# Patient Record
Sex: MALE | Race: WHITE | NOT HISPANIC OR LATINO
[De-identification: names, ages, dates, MRNs, and addresses within clinical notes are randomized per-mention and may not be internally consistent; named-entity substitution may affect disease eponyms.]

---

## 2017-11-15 ENCOUNTER — APPOINTMENT (OUTPATIENT)
Dept: PULMONOLOGY | Facility: CLINIC | Age: 32
End: 2017-11-15
Payer: COMMERCIAL

## 2017-11-15 VITALS
OXYGEN SATURATION: 98 % | SYSTOLIC BLOOD PRESSURE: 106 MMHG | HEART RATE: 81 BPM | HEIGHT: 66 IN | RESPIRATION RATE: 17 BRPM | DIASTOLIC BLOOD PRESSURE: 78 MMHG | BODY MASS INDEX: 26.52 KG/M2 | WEIGHT: 165 LBS

## 2017-11-15 DIAGNOSIS — Z91.89 OTHER SPECIFIED PERSONAL RISK FACTORS, NOT ELSEWHERE CLASSIFIED: ICD-10-CM

## 2017-11-15 DIAGNOSIS — Z84.89 FAMILY HISTORY OF OTHER SPECIFIED CONDITIONS: ICD-10-CM

## 2017-11-15 DIAGNOSIS — Z82.5 FAMILY HISTORY OF ASTHMA AND OTHER CHRONIC LOWER RESPIRATORY DISEASES: ICD-10-CM

## 2017-11-15 DIAGNOSIS — Z87.891 PERSONAL HISTORY OF NICOTINE DEPENDENCE: ICD-10-CM

## 2017-11-15 PROCEDURE — 94060 EVALUATION OF WHEEZING: CPT

## 2017-11-15 PROCEDURE — 71020: CPT

## 2017-11-15 PROCEDURE — 99205 OFFICE O/P NEW HI 60 MIN: CPT | Mod: 25

## 2017-11-15 PROCEDURE — 94729 DIFFUSING CAPACITY: CPT

## 2017-11-15 PROCEDURE — 94727 GAS DIL/WSHOT DETER LNG VOL: CPT

## 2017-11-15 RX ORDER — OLOPATADINE HYDROCHLORIDE 665 UG/1
0.6 SPRAY, METERED NASAL
Qty: 3 | Refills: 1 | Status: ACTIVE | COMMUNITY
Start: 2017-11-15 | End: 1900-01-01

## 2017-11-24 ENCOUNTER — LABORATORY RESULT (OUTPATIENT)
Age: 32
End: 2017-11-24

## 2017-11-24 ENCOUNTER — APPOINTMENT (OUTPATIENT)
Dept: PULMONOLOGY | Facility: CLINIC | Age: 32
End: 2017-11-24

## 2017-11-24 LAB
BASOPHILS # BLD AUTO: 0.03 K/UL
BASOPHILS NFR BLD AUTO: 0.5 %
EOSINOPHIL # BLD AUTO: 0.06 K/UL
EOSINOPHIL NFR BLD AUTO: 1 %
HCT VFR BLD CALC: 43.8 %
HGB BLD-MCNC: 14.8 G/DL
IMM GRANULOCYTES NFR BLD AUTO: 0.2 %
LYMPHOCYTES # BLD AUTO: 2.4 K/UL
LYMPHOCYTES NFR BLD AUTO: 38.1 %
MAN DIFF?: NORMAL
MCHC RBC-ENTMCNC: 31.4 PG
MCHC RBC-ENTMCNC: 33.8 GM/DL
MCV RBC AUTO: 93 FL
MONOCYTES # BLD AUTO: 0.6 K/UL
MONOCYTES NFR BLD AUTO: 9.5 %
NEUTROPHILS # BLD AUTO: 3.2 K/UL
NEUTROPHILS NFR BLD AUTO: 50.7 %
PLATELET # BLD AUTO: 361 K/UL
RBC # BLD: 4.71 M/UL
RBC # FLD: 12.4 %
WBC # FLD AUTO: 6.3 K/UL

## 2017-11-25 LAB
24R-OH-CALCIDIOL SERPL-MCNC: 38.8 PG/ML
25(OH)D3 SERPL-MCNC: 28.3 NG/ML
IGE SER-MCNC: 462 IU/ML

## 2017-11-28 ENCOUNTER — CLINICAL ADVICE (OUTPATIENT)
Age: 32
End: 2017-11-28

## 2017-11-29 LAB
A ALTERNATA IGE QN: <0.1 KUA/L
A FUMIGATUS IGE QN: 0.1 KUA/L
C ALBICANS IGE QN: <0.1 KUA/L
C HERBARUM IGE QN: <0.1 KUA/L
CAT DANDER IGE QN: 67 KUA/L
CLAM IGE QN: <0.1 KUA/L
CODFISH IGE QN: <0.1 KUA/L
COMMON RAGWEED IGE QN: 0.17 KUA/L
CORN IGE QN: <0.1 KUA/L
COW MILK IGE QN: 0.18 KUA/L
D FARINAE IGE QN: 0.42 KUA/L
D PTERONYSS IGE QN: 0.31 KUA/L
DEPRECATED A ALTERNATA IGE RAST QL: 0
DEPRECATED A FUMIGATUS IGE RAST QL: NORMAL
DEPRECATED C ALBICANS IGE RAST QL: 0
DEPRECATED C HERBARUM IGE RAST QL: 0
DEPRECATED CAT DANDER IGE RAST QL: ABNORMAL
DEPRECATED CLAM IGE RAST QL: 0
DEPRECATED CODFISH IGE RAST QL: 0
DEPRECATED COMMON RAGWEED IGE RAST QL: NORMAL
DEPRECATED CORN IGE RAST QL: 0
DEPRECATED COW MILK IGE RAST QL: NORMAL
DEPRECATED D FARINAE IGE RAST QL: ABNORMAL
DEPRECATED D PTERONYSS IGE RAST QL: NORMAL
DEPRECATED DOG DANDER IGE RAST QL: ABNORMAL
DEPRECATED EGG WHITE IGE RAST QL: 0
DEPRECATED M RACEMOSUS IGE RAST QL: 0
DEPRECATED PEANUT IGE RAST QL: NORMAL
DEPRECATED ROACH IGE RAST QL: 0
DEPRECATED SCALLOP IGE RAST QL: 0.13 KUA/L
DEPRECATED SESAME SEED IGE RAST QL: 0
DEPRECATED SHRIMP IGE RAST QL: 0
DEPRECATED SOYBEAN IGE RAST QL: 0
DEPRECATED TIMOTHY IGE RAST QL: ABNORMAL
DEPRECATED WALNUT IGE RAST QL: 0
DEPRECATED WHEAT IGE RAST QL: NORMAL
DEPRECATED WHITE OAK IGE RAST QL: ABNORMAL
DOG DANDER IGE QN: 5.37 KUA/L
EGG WHITE IGE QN: <0.1 KUA/L
M RACEMOSUS IGE QN: <0.1 KUA/L
PEANUT IGE QN: 0.22 KUA/L
ROACH IGE QN: <0.1 KUA/L
SCALLOP IGE QN: <0.1 KUA/L
SCALLOP IGE QN: NORMAL
SESAME SEED IGE QN: <0.1 KUA/L
SOYBEAN IGE QN: <0.1 KUA/L
TIMOTHY IGE QN: 2.47 KUA/L
WALNUT IGE QN: <0.1 KUA/L
WHEAT IGE QN: 0.1 KUA/L
WHITE OAK IGE QN: 15.6 KUA/L

## 2017-12-27 ENCOUNTER — APPOINTMENT (OUTPATIENT)
Dept: PULMONOLOGY | Facility: CLINIC | Age: 32
End: 2017-12-27
Payer: COMMERCIAL

## 2017-12-27 VITALS
HEIGHT: 68 IN | RESPIRATION RATE: 17 BRPM | OXYGEN SATURATION: 97 % | HEART RATE: 84 BPM | WEIGHT: 165 LBS | SYSTOLIC BLOOD PRESSURE: 118 MMHG | DIASTOLIC BLOOD PRESSURE: 80 MMHG | BODY MASS INDEX: 25.01 KG/M2

## 2017-12-27 PROCEDURE — 95012 NITRIC OXIDE EXP GAS DETER: CPT

## 2017-12-27 PROCEDURE — 94010 BREATHING CAPACITY TEST: CPT

## 2017-12-27 PROCEDURE — 99214 OFFICE O/P EST MOD 30 MIN: CPT | Mod: 25

## 2018-05-09 ENCOUNTER — NON-APPOINTMENT (OUTPATIENT)
Age: 33
End: 2018-05-09

## 2018-05-09 ENCOUNTER — APPOINTMENT (OUTPATIENT)
Dept: PULMONOLOGY | Facility: CLINIC | Age: 33
End: 2018-05-09
Payer: COMMERCIAL

## 2018-05-09 VITALS
BODY MASS INDEX: 24.25 KG/M2 | WEIGHT: 160 LBS | DIASTOLIC BLOOD PRESSURE: 80 MMHG | HEART RATE: 80 BPM | OXYGEN SATURATION: 99 % | RESPIRATION RATE: 17 BRPM | HEIGHT: 68 IN | SYSTOLIC BLOOD PRESSURE: 110 MMHG

## 2018-05-09 PROCEDURE — 94010 BREATHING CAPACITY TEST: CPT

## 2018-05-09 PROCEDURE — 95012 NITRIC OXIDE EXP GAS DETER: CPT

## 2018-05-09 PROCEDURE — 99214 OFFICE O/P EST MOD 30 MIN: CPT | Mod: 25

## 2018-05-09 RX ORDER — AMOXICILLIN AND CLAVULANATE POTASSIUM 875; 125 MG/1; MG/1
875-125 TABLET, COATED ORAL
Qty: 20 | Refills: 0 | Status: DISCONTINUED | COMMUNITY
Start: 2017-12-23

## 2018-06-04 ENCOUNTER — MEDICATION RENEWAL (OUTPATIENT)
Age: 33
End: 2018-06-04

## 2018-07-27 ENCOUNTER — MEDICATION RENEWAL (OUTPATIENT)
Age: 33
End: 2018-07-27

## 2018-08-28 RX ORDER — BECLOMETHASONE DIPROPIONATE 80 UG/1
80 AEROSOL, METERED RESPIRATORY (INHALATION) TWICE DAILY
Qty: 3 | Refills: 1 | Status: DISCONTINUED | COMMUNITY
Start: 2017-11-15 | End: 2018-08-28

## 2018-09-27 ENCOUNTER — MEDICATION RENEWAL (OUTPATIENT)
Age: 33
End: 2018-09-27

## 2018-09-27 ENCOUNTER — RX RENEWAL (OUTPATIENT)
Age: 33
End: 2018-09-27

## 2018-09-27 RX ORDER — ALBUTEROL SULFATE 90 UG/1
108 (90 BASE) AEROSOL, METERED RESPIRATORY (INHALATION)
Qty: 1 | Refills: 0 | Status: ACTIVE | COMMUNITY
Start: 2018-09-27 | End: 1900-01-01

## 2018-11-07 ENCOUNTER — NON-APPOINTMENT (OUTPATIENT)
Age: 33
End: 2018-11-07

## 2018-11-07 ENCOUNTER — APPOINTMENT (OUTPATIENT)
Dept: PULMONOLOGY | Facility: CLINIC | Age: 33
End: 2018-11-07
Payer: COMMERCIAL

## 2018-11-07 VITALS
HEIGHT: 68 IN | DIASTOLIC BLOOD PRESSURE: 80 MMHG | SYSTOLIC BLOOD PRESSURE: 110 MMHG | BODY MASS INDEX: 24.25 KG/M2 | HEART RATE: 87 BPM | WEIGHT: 160 LBS | OXYGEN SATURATION: 98 % | RESPIRATION RATE: 17 BRPM

## 2018-11-07 PROCEDURE — 95012 NITRIC OXIDE EXP GAS DETER: CPT

## 2018-11-07 PROCEDURE — 99214 OFFICE O/P EST MOD 30 MIN: CPT | Mod: 25

## 2018-11-07 PROCEDURE — 94010 BREATHING CAPACITY TEST: CPT

## 2018-11-07 NOTE — PROCEDURE
[FreeTextEntry1] : PFT-spi reveals normal flows with FEV1 of 3.46  ,which is   84% of predicted, normal flow volume loop \par \par FENO was    27   ; a normal value being less than 25\par \par Fractional exhaled nitric oxide (FENO) is regarded as a simple, noninvasive method for assessing eosinophilic airway inflammation. Produced by a variety of cells within the lung, nitric oxide (NO) concentrations are generally low in healthy individuals. However, high concentrations of NO appear to be involved in nonspecific host defense mechanisms and chronic inflammatory diseases such as asthma. The American Thoracic Society (ATS) therefore has recommended using FENO to aid in the diagnosis and monitoring of eosinophilic airway inflammation and asthma, and for identifying steroid responsive individuals whose chronic respiratory symptoms may be caused by airway inflammation.

## 2018-11-07 NOTE — HISTORY OF PRESENT ILLNESS
[FreeTextEntry1] : Mr. Zelaya is a 33 year old male presenting to the office for a follow up visit for asthma, elevated IgE level, smoking history, low vitamin D, seasonal allergies, wheezing and shortness of breath. His chief complaint is acid reflux.\par -He notes he has been feeling good aside from his acid reflux\par -He notes he has followed up with a GI specialist for his reflux\par -He notes he has been exercising 4 days weekly\par -He states he uses his rescue inhaler before exercising\par -He states his sleep has been improving\par -He notes he has room darkening shades\par -He reports his bowels are regular\par -He notes his energy level is good, rating it 9 out of 10\par -He notes he drinks coffee daily, but no more than 1 cup daily\par -He denies headaches, nausea vomiting, fevers, chills, sweats, chest pain or pressure, diarrhea, constipation, dysphagia, snoring

## 2018-11-07 NOTE — REASON FOR VISIT
[Follow-Up] : a follow-up visit [FreeTextEntry1] : asthma, elevated IgE level, smoking history, low vitamin D, seasonal allergies, wheezing and shortness of breath

## 2018-11-07 NOTE — ASSESSMENT
[FreeTextEntry1] : Mr. Zelaya is a 32 y/o M with hx of anxiety, seasonal allergies, GERD and frequent bronchitis presenting for pulmonary evaluation.\par \par His SOB is multifactorial due to:\par -asthma\par -poor mechanics of breathing\par -anxiety\par \par Problem 1: asthma\par -Bevespi 2 puffs BID\par -Qvar 80 2 puffs BID \par -Ventolin PRN and before exercise \par -hydration 16 ounces of water prior to exercise \par -Literature provided. \par \par -Inhaler technique reviewed as well as oral hygiene techniques reviewed with patient. Avoidance of cold air, extremes of temperature, rescue inhaler should be used before exercise. Order of medication reviewed with patient. Recommended use of a cool mist humidifier in the bedroom. Gargle and spit after use.\par -Asthma is believed to be caused by inherited (genetic) and environmental factor, but its exact cause is unknown. Asthma may be triggered by allergens, lung infections, or irritants in the air. Asthma triggers are different for each person \par \par Problem 2: poor mechanics of breathing\par -Proper breathing techniques were reviewed with an emphasis of exhalation. Patient instructed to breath in for 1 second and out for four seconds. Patient was encouraged to not talk while walking. \par \par Problem 3: allergies/sinus\par -Olopatadine 0.6% 1 sniff BID\par -recommended to try gluten free diet, as blood work reveals slight gluten allergy\par -Environmental measures for allergies were encouraged including mattress and pillow cover, air purifier, and environmental controls.\par \par Problem 4: former smoker\par -discussed staying nicotine-free \par -not a candidate for lung cancer screening \par \par problem 5: elevated IgE level\par -recommended as candidate of Xolair, though no need to implement at this point in time \par -Xolair is a recombinant DNA- derived humanized IgG1K monoclonal antibody that selectively binds ot human immunoglobulin E (IgE). Xolair is produced by a Chinese hamster ovary cell suspension culture in nutrient medium containing the antibiotic gentamicin. Gentamicin is not detectable in the final product. Xolair is a sterile, white, preservative free, lyophilized powder contained in a single use vial that is reconstituted with sterile water for suspension. Side effects include: wheezing, tightness of the chest, trouble breathing, hives, skin rash, feeling anxious or light-headed, fainting, warmth or tingling under skin, or swelling of face, lips, or tongue \par \par problem 6: low vitamin D\par -continue taking vitamin D supplements \par -Low vitamin D Has been associated with asthma exacerbations and increased allergic symptoms. The goal based on recent information is maintaining levels between 50-70 and low normal is 30. Recommended 50,000 units every two weeks to once a month depending on the level. \par \par problem 7: GERD\par -continue Zantac 300 mg QHS \par -Things to avoid including overeating, spicy foods, tight clothing, eating within two hours of bed, this list is not all inclusive. \par -For treatment of reflux, possible options discussed including diet control, H2 blockers, PPIs, as well as coating motility agents discussed as treatment options. Timing of meals and proximity of last meal to sleep were discussed. If symptoms persist, a formal gastrointestinal evaluation is needed.\par -Rule of 2's: Avoid eating too late, too fast, too much, too spicy or within two hours of bedtime \par \par problem 8: health maintenance \par -recommended to hydrate heavily with water in the morning and refrain from drinking coffee for 90 minutes after waking \par -s/p 2018 influenza vaccination\par -recommended strep pneumonia vaccines: Prevnar-13 vaccine, followed by Pneumo vaccine 23 one year following\par -recommended early intervention for URIs\par -recommended regular osteoporosis evaluations\par -recommended early dermatological evaluations\par -recommended after the age of 50 to the age of 70, colonoscopy every 5 years \par  \par \par Follow up in 6 months\par He is encouraged to call with any questions, concerns, or changes.

## 2018-11-07 NOTE — ADDENDUM
[FreeTextEntry1] : Documented by Danika Milligan acting as a scribe for Dr. Matthew Domínguez on 11/7/2018.\par \par All medical record entries made by the Scribe were at my, Dr. Matthew Domínguez's, direction and personally dictated by me on 11/7/2018. I have reviewed the chart and agree that the record accurately reflects my personal performance of the history, physical exam, assessment and plan. I have also personally directed, reviewed, and agree with the discharge instructions.

## 2019-01-07 ENCOUNTER — RX RENEWAL (OUTPATIENT)
Age: 34
End: 2019-01-07

## 2019-04-10 ENCOUNTER — RX RENEWAL (OUTPATIENT)
Age: 34
End: 2019-04-10

## 2019-05-06 ENCOUNTER — TRANSCRIPTION ENCOUNTER (OUTPATIENT)
Age: 34
End: 2019-05-06

## 2019-06-09 ENCOUNTER — RX RENEWAL (OUTPATIENT)
Age: 34
End: 2019-06-09

## 2019-09-09 ENCOUNTER — APPOINTMENT (OUTPATIENT)
Dept: PULMONOLOGY | Facility: CLINIC | Age: 34
End: 2019-09-09
Payer: COMMERCIAL

## 2019-09-09 ENCOUNTER — NON-APPOINTMENT (OUTPATIENT)
Age: 34
End: 2019-09-09

## 2019-09-09 VITALS
BODY MASS INDEX: 24.25 KG/M2 | WEIGHT: 160 LBS | RESPIRATION RATE: 17 BRPM | DIASTOLIC BLOOD PRESSURE: 70 MMHG | HEIGHT: 68 IN | SYSTOLIC BLOOD PRESSURE: 110 MMHG | OXYGEN SATURATION: 98 % | HEART RATE: 78 BPM

## 2019-09-09 DIAGNOSIS — R06.2 WHEEZING: ICD-10-CM

## 2019-09-09 DIAGNOSIS — Z87.891 PERSONAL HISTORY OF NICOTINE DEPENDENCE: ICD-10-CM

## 2019-09-09 PROCEDURE — 95012 NITRIC OXIDE EXP GAS DETER: CPT

## 2019-09-09 PROCEDURE — 94010 BREATHING CAPACITY TEST: CPT

## 2019-09-09 PROCEDURE — 99214 OFFICE O/P EST MOD 30 MIN: CPT | Mod: 25

## 2019-09-09 NOTE — PROCEDURE
[FreeTextEntry1] : PFT revealed normal flows, with a FEV1 of 3.55L, which is 87% of predicted, with a normal flow volume loop \par \par FENO was 22; a normal value being less than 25\par Fractional exhaled nitric oxide (FENO) is regarded as a simple, noninvasive method for assessing eosinophilic airway inflammation. Produced by a variety of cells within the lung, nitric oxide (NO) concentrations are generally low in healthy individuals. However, high concentrations of NO appear to be involved in nonspecific host defense mechanisms and chronic inflammatory diseases such as asthma. The American Thoracic Society (ATS) therefore has recommended using FENO to aid in the diagnosis and monitoring of eosinophilic airway inflammation and asthma, and for identifying steroid responsive individuals whose chronic respiratory symptoms may be caused by airway inflammation.

## 2019-09-09 NOTE — REVIEW OF SYSTEMS
[Negative] : Sleep Disorder [As Noted in HPI] : as noted in HPI [Wheezing] : no wheezing [Chest Discomfort] : no chest discomfort [Heartburn] : no heartburn [Reflux] : no reflux [Dysphagia] : no dysphagia [Constipation] : no constipation [Diarrhea] : no diarrhea

## 2019-09-09 NOTE — HISTORY OF PRESENT ILLNESS
[FreeTextEntry1] : Mr. Zelaya is a 34 year old male presenting to the office for a follow up visit for asthma, elevated IgE level, smoking history, low vitamin D, seasonal allergies, wheezing and shortness of breath. His chief complaint is occasional allergy symptoms.\par -he reports feeling generally well\par -he notes having had heartburn, but has since resolved with use of 2 medications.\par -he notes he has been exercising significantly regularly, doing high intensity training twice per week, full body workouts, and the [employer]\par -he notes his energy level is 8/10\par -he denies taking any new medications, vitamins, or supplements, except for a multivitamin and fish oil\par -he notes he has been taking Zyrtec for several months 1 QD, which alleviated his allergy symptoms\par -he denies any wheezing, headaches, nausea, chest pain, chest pressure, diarrhea, constipation, dysphagia, dizziness, sour taste in the mouth, heartburn, reflux

## 2019-09-09 NOTE — ASSESSMENT
[FreeTextEntry1] : Mr. Zelaya is a 35 y/o M with hx of anxiety, seasonal allergies, asthma, GERD and frequent bronchitis presenting for pulmonary evaluation. He is stable from a pulmonary perspective.\par \par His SOB is multifactorial due to:\par -asthma\par -poor mechanics of breathing\par -anxiety\par \par Problem 1: asthma (stable)\par -Bevespi 2 puffs BID\par -Qvar 80 2 puffs BID \par -Ventolin PRN and before exercise \par -hydration 16 ounces of water prior to exercise \par -Literature provided prior. \par \par -Inhaler technique reviewed as well as oral hygiene techniques reviewed with patient. Avoidance of cold air, extremes of temperature, rescue inhaler should be used before exercise. Order of medication reviewed with patient. Recommended use of a cool mist humidifier in the bedroom. Gargle and spit after use.\par -Asthma is believed to be caused by inherited (genetic) and environmental factor, but its exact cause is unknown. Asthma may be triggered by allergens, lung infections, or irritants in the air. Asthma triggers are different for each person \par \par Problem 2: poor mechanics of breathing\par -Proper breathing techniques were reviewed with an emphasis of exhalation. Patient instructed to breath in for 1 second and out for four seconds. Patient was encouraged to not talk while walking. \par \par Problem 3: allergies/sinus\par -Continue Zyrtec 10 mg QHS\par -Olopatadine 0.6% 1 sniff BID\par -recommended to try gluten free diet, as blood work reveals slight gluten allergy\par -Environmental measures for allergies were encouraged including mattress and pillow cover, air purifier, and environmental controls.\par \par Problem 4: former smoker\par -discussed staying nicotine-free today\par -not a candidate for lung cancer screening \par \par problem 5: elevated IgE level\par -recommended as candidate of Xolair, though no need to implement at this point in time \par -Xolair is a recombinant DNA- derived humanized IgG1K monoclonal antibody that selectively binds ot human immunoglobulin E (IgE). Xolair is produced by a Chinese hamster ovary cell suspension culture in nutrient medium containing the antibiotic gentamicin. Gentamicin is not detectable in the final product. Xolair is a sterile, white, preservative free, lyophilized powder contained in a single use vial that is reconstituted with sterile water for suspension. Side effects include: wheezing, tightness of the chest, trouble breathing, hives, skin rash, feeling anxious or light-headed, fainting, warmth or tingling under skin, or swelling of face, lips, or tongue \par \par problem 6: low vitamin D\par -continue taking vitamin D supplements \par -Low vitamin D Has been associated with asthma exacerbations and increased allergic symptoms. The goal based on recent information is maintaining levels between 50-70 and low normal is 30. Recommended 50,000 units every two weeks to once a month depending on the level. \par \par problem 7: GERD\par -continue Zantac 300 mg QHS \par -Things to avoid including overeating, spicy foods, tight clothing, eating within two hours of bed, this list is not all inclusive. \par -For treatment of reflux, possible options discussed including diet control, H2 blockers, PPIs, as well as coating motility agents discussed as treatment options. Timing of meals and proximity of last meal to sleep were discussed. If symptoms persist, a formal gastrointestinal evaluation is needed.\par -Rule of 2's: Avoid eating too late, too fast, too much, too spicy or within two hours of bedtime \par \par problem 8: health maintenance \par -recommended to hydrate heavily with water in the morning and refrain from drinking coffee for 90 minutes after waking \par -s/p 2018 influenza vaccination\par -recommended strep pneumonia vaccines: Prevnar-13 vaccine, followed by Pneumo vaccine 23 one year following\par -recommended early intervention for URIs\par -recommended regular osteoporosis evaluations\par -recommended early dermatological evaluations\par -recommended after the age of 50 to the age of 70, colonoscopy every 5 years \par \par Follow up in 6 months with SPI and NiOx\par He is encouraged to call with any questions, concerns, or changes.

## 2019-09-09 NOTE — ADDENDUM
[FreeTextEntry1] : Documented by Chuck Miranda acting as a scribe for Dr. Matthew Domínguez on 09/09/2019.\par \par All medical record entries made by the Scribe were at my, Dr. Matthew Domínguez's, direction and personally dictated by me on 09/09/2019. I have reviewed the chart and agree that the record accurately reflects my personal performance of the history, physical exam, assessment and plan. I have also personally directed, reviewed, and agree with the discharge instructions.

## 2019-10-10 ENCOUNTER — RX RENEWAL (OUTPATIENT)
Age: 34
End: 2019-10-10

## 2019-12-11 ENCOUNTER — RX RENEWAL (OUTPATIENT)
Age: 34
End: 2019-12-11

## 2019-12-24 ENCOUNTER — RX RENEWAL (OUTPATIENT)
Age: 34
End: 2019-12-24

## 2020-01-21 ENCOUNTER — RX RENEWAL (OUTPATIENT)
Age: 35
End: 2020-01-21

## 2020-01-24 ENCOUNTER — APPOINTMENT (OUTPATIENT)
Dept: ORTHOPEDIC SURGERY | Facility: CLINIC | Age: 35
End: 2020-01-24
Payer: COMMERCIAL

## 2020-01-24 VITALS — BODY MASS INDEX: 24.25 KG/M2 | WEIGHT: 160 LBS | HEIGHT: 68 IN

## 2020-01-24 DIAGNOSIS — M25.512 PAIN IN LEFT SHOULDER: ICD-10-CM

## 2020-01-24 DIAGNOSIS — S46.002A UNSPECIFIED INJURY OF MUSCLE(S) AND TENDON(S) OF THE ROTATOR CUFF OF LEFT SHOULDER, INITIAL ENCOUNTER: ICD-10-CM

## 2020-01-24 DIAGNOSIS — Z87.09 PERSONAL HISTORY OF OTHER DISEASES OF THE RESPIRATORY SYSTEM: ICD-10-CM

## 2020-01-24 PROCEDURE — 73030 X-RAY EXAM OF SHOULDER: CPT | Mod: LT

## 2020-01-24 PROCEDURE — 99203 OFFICE O/P NEW LOW 30 MIN: CPT

## 2020-01-24 RX ORDER — CELECOXIB 200 MG/1
200 CAPSULE ORAL TWICE DAILY
Qty: 60 | Refills: 3 | Status: ACTIVE | COMMUNITY
Start: 2020-01-24 | End: 1900-01-01

## 2020-01-24 NOTE — HISTORY OF PRESENT ILLNESS
[de-identified] : LEFT SHOULDER\par DECEMBER 15, 2019- GYM INJURY\par PAIN LEVEL 5/10\par INTERMITTENT PAIN\par DULL, SHARP WHEN AGGRAVATED \par BETTER WITH HEAT, ICE , NAPROXEN\par WORSE LYING DOWN, REACHING ACROSS, BEHIND, \par CLICKING\par \par

## 2020-01-24 NOTE — PHYSICAL EXAM
[de-identified] : PHYSICAL EXAM LEFT  SHOULDER\par \par NORMAL POSTURE / SCAPULAR PROTRACTION\par AROM 140 / 140 / 90 / 30\par TENDER: SA REGION / AC JOINT / GH JOINT / BICEPS GROOVE\par \par SPECIAL TESTING :\par PAGAN - POSITIVE \par NIYA - POSITIVE \par SPEED TEST - POSITIVE\par \par BARON - NEGATIVE \par APPREHENSION AND SUPPRESSION - NEGATIVE \par \par RC STRENGTH TESTING \par SS:  5/5\par SUB 5/5\par IS     5/5\par BICEPS  5/5\par \par SENSATION  - GROSSLY INTACT\par \par \par  [de-identified] : LEFT SHOULDER XRAY -  \par NO OBVIOUS FRACTURE , NO SIGNIFICANT OSTEOARTHRITIS, SEPARATION OR DISLOCATION \par TYPE 2B ACROMION low lying\par CSA= 37.1\par

## 2020-01-24 NOTE — DISCUSSION/SUMMARY
[de-identified] : DICLOFENAC 2 X DAY 2-3 WEEKS\par HOME EXERCISES DAILY\par PT 2 X 4 WEEKS \par \par CONSIDER KENALOG INJECTION \par \par MRI IF NO RELIEF AFTER 12 WEEK OF TREATMENT\par

## 2020-02-16 ENCOUNTER — RX RENEWAL (OUTPATIENT)
Age: 35
End: 2020-02-16

## 2020-04-13 ENCOUNTER — APPOINTMENT (OUTPATIENT)
Dept: PULMONOLOGY | Facility: CLINIC | Age: 35
End: 2020-04-13
Payer: COMMERCIAL

## 2020-04-13 ENCOUNTER — APPOINTMENT (OUTPATIENT)
Dept: PULMONOLOGY | Facility: CLINIC | Age: 35
End: 2020-04-13

## 2020-04-13 PROCEDURE — 99214 OFFICE O/P EST MOD 30 MIN: CPT | Mod: 95

## 2020-04-13 RX ORDER — DESLORATADINE 5 MG/1
5 TABLET ORAL
Qty: 90 | Refills: 1 | Status: ACTIVE | COMMUNITY
Start: 2020-04-13 | End: 1900-01-01

## 2020-04-13 NOTE — ASSESSMENT
[FreeTextEntry1] : Mr. Zelaya is a 35 y/o M with hx of anxiety, seasonal allergies, asthma, GERD and frequent bronchitis video calls for pulmonary evaluation. He has residual of sinusitis. \par \par His SOB is multifactorial due to:\par -asthma\par -poor mechanics of breathing\par -anxiety\par \par Problem 1: asthma (stable)\par -Bevespi 2 puffs BID\par -Qvar 80 2 puffs BID \par -Ventolin PRN and before exercise \par -hydration 16 ounces of water prior to exercise \par -Literature provided prior. \par \par -Inhaler technique reviewed as well as oral hygiene techniques reviewed with patient. Avoidance of cold air, extremes of temperature, rescue inhaler should be used before exercise. Order of medication reviewed with patient. Recommended use of a cool mist humidifier in the bedroom. Gargle and spit after use.\par -Asthma is believed to be caused by inherited (genetic) and environmental factor, but its exact cause is unknown. Asthma may be triggered by allergens, lung infections, or irritants in the air. Asthma triggers are different for each person \par \par Problem 2: poor mechanics of breathing\par -Proper breathing techniques were reviewed with an emphasis of exhalation. Patient instructed to breath in for 1 second and out for four seconds. Patient was encouraged to not talk while walking. \par \par Problem 3: allergies/sinus (active) \par -Continue Zyrtec 10 mg QHS\par -Add Clarinex 5mg QHS \par -Add Qnasal 1 sniff/nostril BID \par -Olopatadine 0.6% 1 sniff BID\par -recommended to try gluten free diet, as blood work reveals slight gluten allergy\par -Environmental measures for allergies were encouraged including mattress and pillow cover, air purifier, and environmental controls.\par \par Problem 4: former smoker\par -discussed staying nicotine-free today\par -not a candidate for lung cancer screening \par \par problem 5: elevated IgE level\par -recommended as candidate of Xolair, though no need to implement at this point in time \par -Xolair is a recombinant DNA- derived humanized IgG1K monoclonal antibody that selectively binds ot human immunoglobulin E (IgE). Xolair is produced by a Chinese hamster ovary cell suspension culture in nutrient medium containing the antibiotic gentamicin. Gentamicin is not detectable in the final product. Xolair is a sterile, white, preservative free, lyophilized powder contained in a single use vial that is reconstituted with sterile water for suspension. Side effects include: wheezing, tightness of the chest, trouble breathing, hives, skin rash, feeling anxious or light-headed, fainting, warmth or tingling under skin, or swelling of face, lips, or tongue \par \par problem 6: low vitamin D\par -continue taking vitamin D supplements \par -Low vitamin D Has been associated with asthma exacerbations and increased allergic symptoms. The goal based on recent information is maintaining levels between 50-70 and low normal is 30. Recommended 50,000 units every two weeks to once a month depending on the level. \par \par problem 7: GERD\par -continue Zantac 300 mg QHS \par -Things to avoid including overeating, spicy foods, tight clothing, eating within two hours of bed, this list is not all inclusive. \par -For treatment of reflux, possible options discussed including diet control, H2 blockers, PPIs, as well as coating motility agents discussed as treatment options. Timing of meals and proximity of last meal to sleep were discussed. If symptoms persist, a formal gastrointestinal evaluation is needed.\par -Rule of 2's: Avoid eating too late, too fast, too much, too spicy or within two hours of bedtime \par \par Problem 8: Health Maintenance/COVID19 Precautions:\par - Clean your hands often. Wash your hands often with soap and water for at least 20 seconds, especially after blowing your nose, coughing, or sneezing, or having been in a public place.\par - If soap and water are not available, use a hand  that contains at least 60% alcohol.\par - To the extent possible, avoid touching high-touch surfaces in public places - elevator buttons, door handles, handrails, handshaking with people, etc. Use a tissue or your sleeve to cover your hand or finger if you must touch something.\par - Wash your hands after touching surfaces in public places.\par - Avoid touching your face, nose, eyes, etc.\par - Clean and disinfect your home to remove germs: practice routine cleaning of frequently touched surfaces (for example: tables, doorknobs, light switches, handles, desks, toilets, faucets, sinks & cell phones)\par - Avoid crowds, especially in poorly ventilated spaces. Your risk of exposure to respiratory viruses like COVID-19 may increase in crowded, closed-in settings with little air circulation if\par there are people in the crowd who are sick. All patients are recommended to practice social distancing and stay at least 6 feet away from others.\par - Avoid all non-essential travel including plane trips, and especially avoid embarking on cruise ships.\par -If COVID-19 is spreading in your community, take extra measures to put distance between yourself and other people to further reduce your risk of being exposed to this new virus.\par -Stay home as much as possible.\par - Consider ways of getting food brought to your house through family, social, or commercial networks\par -Be aware that the virus has been known to live in the air up to 3 hours post exposure, cardboard up to 24 hours post exposure, copper up to 4 hours post exposure, steel and plastic up to 2-3 days post exposure. Risk of transmission from these surfaces are affected by many variables.\par \par Immune Support Recommendations:\par -OTC Vitamin C 500mg BID \par -OTC Quercetin 250-500mg BID \par -OTC Zinc 75-100mg per day \par -OTC Melatonin 1or 2mg a night \par -OTC Vitamin D 1-4000mg per day\par -Tonic Water 8oz\par -Recommended to Stay Hydrated (At least a gallon/day)\par \par Asthma and COVID19:\par You need to make sure your asthma is under control. This often requires the use of inhaled corticosteroids (and sometimes oral corticosteroids). Inhaled corticosteroids do not likely reduce your immune system’s ability to fight infections, but oral corticosteroids may. It is important to use the steps above to protect yourself to limit your exposure to any respiratory virus. \par \par problem 9: health maintenance \par -recommended to hydrate heavily with water in the morning and refrain from drinking coffee for 90 minutes after waking \par -s/p 2018 influenza vaccination\par -recommended strep pneumonia vaccines: Prevnar-13 vaccine, followed by Pneumo vaccine 23 one year following\par -recommended early intervention for URIs\par -recommended regular osteoporosis evaluations\par -recommended early dermatological evaluations\par -recommended after the age of 50 to the age of 70, colonoscopy every 5 years \par \par Follow up in 6 months with SPI and NiOx\par He is encouraged to call with any questions, concerns, or changes.

## 2020-04-13 NOTE — REVIEW OF SYSTEMS
[Fatigue] : fatigue [Sore Throat] : sore throat [Eye Irritation] : eye irritation [Nasal Congestion] : nasal congestion [Sinus Problems] : sinus problems [Negative] : Endocrine

## 2020-04-13 NOTE — HISTORY OF PRESENT ILLNESS
[Home] : at home, [unfilled] , at the time of the visit. [Medical Office: ([facility])___] : at ~his/her~ medical office located in [place] [Patient] : the patient [Self] : self [FreeTextEntry2] : SINDHU BUSTAMANTE  [FreeTextEntry1] : Mr. Zelaya is a 34 year old male video calls to the office for a follow up visit for asthma, elevated IgE level, smoking history, low vitamin D, seasonal allergies, wheezing and shortness of breath. His chief complaint is current allergies Sx. \par -generally has been feeling well. \par -just suffering from the seasonal allergies. \par -sense of taste has been muted due to allergies. \par -currently taking Zyrtec for the allergies.\par -he notes that His bowels are regular. \par -sore throat presented before the start of the allergies. \par -energy level is wiped out when he takes Zyrtec\par -still using the [place]. \par -has sinus pressure.\par \par -He denies any chest pain, chest pressure, diarrhea, constipation, dysphagia, dizziness, sour taste in the mouth, leg swelling, leg pain, heartburn, reflux, myalgias or arthralgias.

## 2020-04-13 NOTE — ADDENDUM
[FreeTextEntry1] : Documented by Juan Antonio Castillo acting as a scribe for Dr. Matthew Domínguez on 04/13/2020 \par \par All medical record entries made by the Scribe were at my, Dr. Matthew Domínguez's, direction and personally dictated by me on 04/13/2020 . I have reviewed the chart and agree that the record accurately reflects my personal performance of the history, physical exam, assessment and plan. I have also personally directed, reviewed, and agree with the discharge instructions.

## 2020-04-13 NOTE — REASON FOR VISIT
[Follow-Up] : a follow-up visit [FreeTextEntry1] : Video Telehealth - asthma, elevated IgE level, smoking history, low vitamin D, seasonal allergies, wheezing and shortness of breath

## 2020-04-13 NOTE — PHYSICAL EXAM
[No Acute Distress] : no acute distress [Well Nourished] : well nourished [Normal Appearance] : normal appearance [Well Groomed] : well groomed [No Deformities] : no deformities [Well Developed] : well developed [TextBox_2] : Appears Well.

## 2020-04-14 RX ORDER — BECLOMETHASONE DIPROPIONATE 80 UG/1
80 AEROSOL, METERED NASAL
Qty: 3 | Refills: 1 | Status: DISCONTINUED | COMMUNITY
Start: 2020-04-13 | End: 2020-04-14

## 2020-05-14 ENCOUNTER — RX RENEWAL (OUTPATIENT)
Age: 35
End: 2020-05-14

## 2020-06-05 ENCOUNTER — RX RENEWAL (OUTPATIENT)
Age: 35
End: 2020-06-05

## 2020-08-09 ENCOUNTER — RX RENEWAL (OUTPATIENT)
Age: 35
End: 2020-08-09

## 2020-09-18 ENCOUNTER — APPOINTMENT (OUTPATIENT)
Dept: DERMATOLOGY | Facility: CLINIC | Age: 35
End: 2020-09-18
Payer: COMMERCIAL

## 2020-09-18 DIAGNOSIS — L81.4 OTHER MELANIN HYPERPIGMENTATION: ICD-10-CM

## 2020-09-18 DIAGNOSIS — Z12.83 ENCOUNTER FOR SCREENING FOR MALIGNANT NEOPLASM OF SKIN: ICD-10-CM

## 2020-09-18 DIAGNOSIS — D48.9 NEOPLASM OF UNCERTAIN BEHAVIOR, UNSPECIFIED: ICD-10-CM

## 2020-09-18 PROCEDURE — 99203 OFFICE O/P NEW LOW 30 MIN: CPT | Mod: 25

## 2020-09-18 PROCEDURE — 11102 TANGNTL BX SKIN SINGLE LES: CPT

## 2020-09-19 ENCOUNTER — RX RENEWAL (OUTPATIENT)
Age: 35
End: 2020-09-19

## 2020-09-19 RX ORDER — BECLOMETHASONE DIPROPIONATE HFA 80 UG/1
80 AEROSOL, METERED RESPIRATORY (INHALATION) TWICE DAILY
Qty: 10.6 | Refills: 3 | Status: ACTIVE | COMMUNITY
Start: 2018-08-28 | End: 1900-01-01

## 2020-09-21 ENCOUNTER — APPOINTMENT (OUTPATIENT)
Dept: PULMONOLOGY | Facility: CLINIC | Age: 35
End: 2020-09-21
Payer: COMMERCIAL

## 2020-09-21 VITALS
WEIGHT: 165 LBS | HEART RATE: 77 BPM | OXYGEN SATURATION: 98 % | BODY MASS INDEX: 25.01 KG/M2 | RESPIRATION RATE: 17 BRPM | DIASTOLIC BLOOD PRESSURE: 70 MMHG | HEIGHT: 68 IN | TEMPERATURE: 97.7 F | SYSTOLIC BLOOD PRESSURE: 120 MMHG

## 2020-09-21 PROCEDURE — 99214 OFFICE O/P EST MOD 30 MIN: CPT | Mod: 25

## 2020-09-21 PROCEDURE — 90682 RIV4 VACC RECOMBINANT DNA IM: CPT

## 2020-09-21 PROCEDURE — 95012 NITRIC OXIDE EXP GAS DETER: CPT

## 2020-09-21 PROCEDURE — G0008: CPT

## 2020-09-21 NOTE — HISTORY OF PRESENT ILLNESS
[FreeTextEntry1] : Mr. Zelaya is a 35 year old male presents to the office for a follow up visit for asthma, elevated IgE level, smoking history, low vitamin D, seasonal allergies, wheezing and shortness of breath. His chief complaint is \par \par -he notes generally feeling well\par -he notes recent broken nose and ER visit, CXR, nasal passages clear, Dr. Agudelo, recommended option of plastic surgery to reset\par -he notes exercising regularly cycling, lifting weights\par -he notes regular bowel movements \par -he notes intermittent dysphagia exacerbated by allergy season\par -he notes sleep moderately improved\par -he notes weight decreased\par \par \par -denies any headaches, nausea, vomiting, fever, chills, sweats, chest pain, chest pressure, diarrhea, constipation, dizziness, leg swelling, leg pain, myalgias, arthralgias, itchy eyes, itchy ears, heartburn, reflux, or sour taste in the mouth.\par \par

## 2020-09-21 NOTE — PHYSICAL EXAM
[No Acute Distress] : no acute distress [Normal Oropharynx] : normal oropharynx [Normal Appearance] : normal appearance [No Neck Mass] : no neck mass [Normal Rate/Rhythm] : normal rate/rhythm [Normal S1, S2] : normal s1, s2 [No Murmurs] : no murmurs [No Resp Distress] : no resp distress [Clear to Auscultation Bilaterally] : clear to auscultation bilaterally [No Abnormalities] : no abnormalities [Benign] : benign [Normal Gait] : normal gait [No Clubbing] : no clubbing [No Cyanosis] : no cyanosis [No Edema] : no edema [FROM] : FROM [Normal Color/ Pigmentation] : normal color/ pigmentation [No Focal Deficits] : no focal deficits [Oriented x3] : oriented x3 [Normal Affect] : normal affect [II] : Mallampati Class: II [TextBox_68] : I:E ratio 1:3; clear

## 2020-09-21 NOTE — PROCEDURE
[FreeTextEntry1] : FENO was 35; a normal value being less than 25\par Fractional exhaled nitric oxide (FENO) is regarded as a simple, noninvasive method for assessing eosinophilic airway inflammation. Produced by a variety of cells within the lung, nitric oxide (NO) concentrations are generally low in healthy individuals. However, high concentrations of NO appear to be involved in nonspecific host defense mechanisms and chronic inflammatory diseases such as asthma. The American Thoracic Society (ATS) therefore has recommended using FENO to aid in the diagnosis and monitoring of eosinophilic airway inflammation and asthma, and for identifying steroid responsive individuals whose chronic respiratory symptoms may be caused by airway inflammation.

## 2020-09-21 NOTE — ASSESSMENT
[FreeTextEntry1] : Mr. Zelaya is a 36 y/o M with hx of anxiety, seasonal allergies, asthma, GERD and frequent bronchitis presents for pulmonary evaluation. He has residual of sinusitis. \par \par His SOB is multifactorial due to:\par -asthma\par -poor mechanics of breathing\par -anxiety\par \par Problem 1: asthma (stable)\par -continue Bevespi 2 puffs BID\par -continue Qvar 80 2 puffs BID \par -continue Ventolin PRN and before exercise \par -recommended hydration 16 ounces of water prior to exercise \par -Literature provided prior. \par \par -Inhaler technique reviewed as well as oral hygiene techniques reviewed with patient. Avoidance of cold air, extremes of temperature, rescue inhaler should be used before exercise. Order of medication reviewed with patient. Recommended use of a cool mist humidifier in the bedroom. Gargle and spit after use.\par -Asthma is believed to be caused by inherited (genetic) and environmental factor, but its exact cause is unknown. Asthma may be triggered by allergens, lung infections, or irritants in the air. Asthma triggers are different for each person \par \par Problem 2: poor mechanics of breathing\par -Proper breathing techniques were reviewed with an emphasis of exhalation. Patient instructed to breath in for 1 second and out for four seconds. Patient was encouraged to not talk while walking. \par \par Problem 3: allergies/sinus (s/p fx nose)\par -Continue Zyrtec 10 mg QHS\par -continue Clarinex 5mg QHS \par -continue Qnasal 1 sniff/nostril BID \par -continue Olopatadine 0.6% 1 sniff BID\par -recommended to try gluten free diet, as blood work reveals slight gluten allergy\par -Environmental measures for allergies were encouraged including mattress and pillow cover, air purifier, and environmental controls.\par \par Problem 4: former smoker \par -discussed staying nicotine-free (9/21/2020)\par -not a candidate for lung cancer screening \par \par problem 5: elevated IgE level\par -recommended as candidate of Xolair, though no need to implement at this point in time \par -Xolair is a recombinant DNA- derived humanized IgG1K monoclonal antibody that selectively binds ot human immunoglobulin E (IgE). Xolair is produced by a Chinese hamster ovary cell suspension culture in nutrient medium containing the antibiotic gentamicin. Gentamicin is not detectable in the final product. Xolair is a sterile, white, preservative free, lyophilized powder contained in a single use vial that is reconstituted with sterile water for suspension. Side effects include: wheezing, tightness of the chest, trouble breathing, hives, skin rash, feeling anxious or light-headed, fainting, warmth or tingling under skin, or swelling of face, lips, or tongue \par \par problem 6: low vitamin D\par -continue taking vitamin D supplements \par -Low vitamin D Has been associated with asthma exacerbations and increased allergic symptoms. The goal based on recent information is maintaining levels between 50-70 and low normal is 30. Recommended 50,000 units every two weeks to once a month depending on the level. \par \par problem 7: GERD\par -Add Pepcid 40 mg QHS \par -Things to avoid including overeating, spicy foods, tight clothing, eating within two hours of bed, this list is not all inclusive. \par -For treatment of reflux, possible options discussed including diet control, H2 blockers, PPIs, as well as coating motility agents discussed as treatment options. Timing of meals and proximity of last meal to sleep were discussed. If symptoms persist, a formal gastrointestinal evaluation is needed.\par -Rule of 2's: Avoid eating too late, too fast, too much, too spicy or within two hours of bedtime \par \par Problem 8: Health Maintenance/COVID19 Precautions:\par - Clean your hands often. Wash your hands often with soap and water for at least 20 seconds, especially after blowing your nose, coughing, or sneezing, or having been in a public place.\par - If soap and water are not available, use a hand  that contains at least 60% alcohol.\par - To the extent possible, avoid touching high-touch surfaces in public places - elevator buttons, door handles, handrails, handshaking with people, etc. Use a tissue or your sleeve to cover your hand or finger if you must touch something.\par - Wash your hands after touching surfaces in public places.\par - Avoid touching your face, nose, eyes, etc.\par - Clean and disinfect your home to remove germs: practice routine cleaning of frequently touched surfaces (for example: tables, doorknobs, light switches, handles, desks, toilets, faucets, sinks & cell phones)\par - Avoid crowds, especially in poorly ventilated spaces. Your risk of exposure to respiratory viruses like COVID-19 may increase in crowded, closed-in settings with little air circulation if\par there are people in the crowd who are sick. All patients are recommended to practice social distancing and stay at least 6 feet away from others.\par - Avoid all non-essential travel including plane trips, and especially avoid embarking on cruise ships.\par -If COVID-19 is spreading in your community, take extra measures to put distance between yourself and other people to further reduce your risk of being exposed to this new virus.\par -Stay home as much as possible.\par - Consider ways of getting food brought to your house through family, social, or commercial networks\par -Be aware that the virus has been known to live in the air up to 3 hours post exposure, cardboard up to 24 hours post exposure, copper up to 4 hours post exposure, steel and plastic up to 2-3 days post exposure. Risk of transmission from these surfaces are affected by many variables.\par \par Immune Support Recommendations:\par -OTC Vitamin C 500mg BID \par -OTC Quercetin 250-500mg BID \par -OTC Zinc 75-100mg per day \par -OTC Melatonin 1or 2mg a night \par -OTC Vitamin D 1-4000mg per day\par -Tonic Water 8oz\par -Recommended to Stay Hydrated (At least a gallon/day)\par \par Asthma and COVID19:\par You need to make sure your asthma is under control. This often requires the use of inhaled corticosteroids (and sometimes oral corticosteroids). Inhaled corticosteroids do not likely reduce your immune system’s ability to fight infections, but oral corticosteroids may. It is important to use the steps above to protect yourself to limit your exposure to any respiratory virus. \par \par problem 9: Health Maintenance/COVID19 Precautions \par - Clean your hands often. Wash your hands often with soap and water for at least 20 seconds, especially after blowing your nose, coughing, or sneezing, or having been in a public place.\par - If soap and water are not available, use a hand  that contains at least 60% alcohol.\par - To the extent possible, avoid touching high-touch surfaces in public places - elevator buttons, door handles, handrails, handshaking with people, etc. Use a tissue or your sleeve to cover your hand or finger if you must touch something.\par - Wash your hands after touching surfaces in public places.\par - Avoid touching your face, nose, eyes, etc.\par - Clean and disinfect your home to remove germs: practice routine cleaning of frequently touched surfaces (for example: tables, doorknobs, light switches, handles, desks, toilets, faucets, sinks & cell phones)\par - Avoid crowds, especially in poorly ventilated spaces. Your risk of exposure to respiratory viruses like COVID-19 may increase in crowded, closed-in settings with little air circulation if there are people in the crowd who are sick. All patients are recommended to practice social distancing and stay at least 6 feet away from others. \par - Avoid all non-essential travel including plane trips, and especially avoid embarking on cruise ships.\par -If COVID-19 is spreading in your community, take extra measures to put distance between yourself and other people to further reduce your risk of being exposed to this new virus.\par -Stay home as much as possible.\par - Consider ways of getting food brought to your house through family, social, or commercial networks\par -Be aware that the virus has been known to live in the air up to 3 hours post exposure, cardboard up to 24 hours post exposure, copper up to 4 hours post exposure, steel and plastic up to 2-3 days post exposure. Risk of transmission from these surfaces are affected by many variables.\par COVID-19 precautionary Immune Support Recommendations:\par -OTC Vitamin C 500mg BID \par -OTC Quercetin 250-500mg BID \par -OTC Zinc 75-100mg per day \par -OTC Melatonin 1 or 2mg a night \par -OTC Vitamin D 1-4000mg per day \par -OTC Tonic Water 8oz per day\par -Water 0.5-1 gallon per day\par Asthma and COVID19:\par You need to make sure your asthma is under control. This often requires the use of inhaled corticosteroids (and sometimes oral corticosteroids). Inhaled corticosteroids do not likely reduce your immune system’s ability to fight infections, but oral corticosteroids may. It is important to use the steps above to protect yourself to limit your exposure to any respiratory virus. \par \par problem 10: health maintenance \par -recommended to hydrate heavily with water in the morning and refrain from drinking coffee for 90 minutes after waking \par -s/p 2020 influenza vaccination\par -recommended strep pneumonia vaccines: Prevnar-13 vaccine, followed by Pneumo vaccine 23 one year following\par -recommended early intervention for URIs\par -recommended regular osteoporosis evaluations\par -recommended early dermatological evaluations\par -recommended after the age of 50 to the age of 70, colonoscopy every 5 years \par \par Follow up in 6 months with SPI and NiOx\par He is encouraged to call with any questions, concerns, or changes.

## 2020-09-21 NOTE — ADDENDUM
[FreeTextEntry1] : Documented by Brendan Carlson acting as a scribe for Dr. Matthew Domínguez on 09/21/2020.\par \par All medical record entries made by the Scribe were at my, Dr. Matthew Domínguez's, direction and personally dictated by me on 09/21/2020 . I have reviewed the chart and agree that the record accurately reflects my personal performance of the history, physical exam, assessment and plan. I have also personally directed, reviewed, and agree with the discharge instructions. \par

## 2020-09-22 LAB — CORE LAB BIOPSY: NORMAL

## 2020-10-14 ENCOUNTER — RX RENEWAL (OUTPATIENT)
Age: 35
End: 2020-10-14

## 2020-11-17 ENCOUNTER — TRANSCRIPTION ENCOUNTER (OUTPATIENT)
Age: 35
End: 2020-11-17

## 2020-12-19 ENCOUNTER — RX RENEWAL (OUTPATIENT)
Age: 35
End: 2020-12-19

## 2021-03-02 DIAGNOSIS — Z01.812 ENCOUNTER FOR PREPROCEDURAL LABORATORY EXAMINATION: ICD-10-CM

## 2021-06-12 ENCOUNTER — RX RENEWAL (OUTPATIENT)
Age: 36
End: 2021-06-12

## 2021-06-12 ENCOUNTER — TRANSCRIPTION ENCOUNTER (OUTPATIENT)
Age: 36
End: 2021-06-12

## 2021-06-15 ENCOUNTER — RX RENEWAL (OUTPATIENT)
Age: 36
End: 2021-06-15

## 2021-06-15 RX ORDER — ALBUTEROL SULFATE 90 UG/1
108 (90 BASE) AEROSOL, METERED RESPIRATORY (INHALATION) EVERY 6 HOURS
Qty: 3 | Refills: 2 | Status: ACTIVE | COMMUNITY
Start: 2020-04-13 | End: 1900-01-01

## 2021-08-19 ENCOUNTER — RX RENEWAL (OUTPATIENT)
Age: 36
End: 2021-08-19

## 2021-09-23 ENCOUNTER — RX RENEWAL (OUTPATIENT)
Age: 36
End: 2021-09-23

## 2021-10-19 ENCOUNTER — RX RENEWAL (OUTPATIENT)
Age: 36
End: 2021-10-19

## 2021-10-30 ENCOUNTER — TRANSCRIPTION ENCOUNTER (OUTPATIENT)
Age: 36
End: 2021-10-30

## 2021-11-01 ENCOUNTER — APPOINTMENT (OUTPATIENT)
Dept: PULMONOLOGY | Facility: CLINIC | Age: 36
End: 2021-11-01
Payer: COMMERCIAL

## 2021-11-01 VITALS
OXYGEN SATURATION: 98 % | RESPIRATION RATE: 16 BRPM | BODY MASS INDEX: 25.74 KG/M2 | SYSTOLIC BLOOD PRESSURE: 120 MMHG | WEIGHT: 164 LBS | DIASTOLIC BLOOD PRESSURE: 76 MMHG | HEIGHT: 67 IN | HEART RATE: 82 BPM | TEMPERATURE: 97.1 F

## 2021-11-01 PROCEDURE — 99214 OFFICE O/P EST MOD 30 MIN: CPT | Mod: 25

## 2021-11-01 PROCEDURE — 94726 PLETHYSMOGRAPHY LUNG VOLUMES: CPT

## 2021-11-01 PROCEDURE — 94010 BREATHING CAPACITY TEST: CPT

## 2021-11-01 PROCEDURE — 95012 NITRIC OXIDE EXP GAS DETER: CPT

## 2021-11-01 PROCEDURE — 94729 DIFFUSING CAPACITY: CPT

## 2021-11-01 PROCEDURE — 94618 PULMONARY STRESS TESTING: CPT

## 2021-11-01 RX ORDER — OLOPATADINE HYDROCHLORIDE 665 UG/1
0.6 SPRAY, METERED NASAL
Qty: 3 | Refills: 1 | Status: ACTIVE | COMMUNITY
Start: 2021-11-01 | End: 1900-01-01

## 2021-11-01 RX ORDER — MONTELUKAST 10 MG/1
10 TABLET, FILM COATED ORAL
Qty: 1 | Refills: 1 | Status: ACTIVE | COMMUNITY
Start: 2021-11-01 | End: 1900-01-01

## 2021-11-01 NOTE — ADDENDUM
[FreeTextEntry1] : Documented by Nanci Best acting as a scribe for Dr. Matthew Domínguez on (11/01/2021).\par \par All medical record entries made by the Scribe were at my, Dr. Matthew Domínguez's, direction and personally dictated by me on (11/01/2021). I have reviewed the chart and agree that the record accurately reflects my personal performance of the history, physical exam, assessment and plan. I have also personally directed, reviewed, and agree with the discharge instructions.\par

## 2021-11-01 NOTE — HISTORY OF PRESENT ILLNESS
[FreeTextEntry1] : Mr. Zelaya is a 36 year old male presents to the office for a follow up visit for asthma, elevated IgE level, smoking history, low vitamin D, seasonal allergies, wheezing and shortness of breath. His chief complaint is \par -he notes having some sinus infection a couple months ago \par -he notes feeling a pnd and infection that he cant figure out what from \par -he notes having half taste and smell\par -he notes his energy is at a 6-7/10 \par -he notes sleeping well \par -he notes sob at night sometimes \par -he denies hoarseness\par -he notes his bowels are regular \par -he notes taking bevespi an qvar \par -he notes his nasal passages are clear but sometimes congested in the nose and other times hes not\par -he notes weight is good \par -he notes sleeping 7-8 hours/night \par -he denies taking singulair or montelukast \par -no new medications, vitamins, or supplements \par - S/p Covid 19 vaccine (Pfizer) x2 (4/2021) \par -he denies joints aches and pains \par patient denies any headaches, nausea, vomiting, fever, chills, sweats, chest pain, chest pressure, palpitations, coughing, wheezing, fatigue, diarrhea, constipation, dysphagia, myalgias, dizziness, leg swelling, leg pain, itchy eyes, itchy ears, heartburn, reflux or sour taste in the mouth

## 2021-11-01 NOTE — ASSESSMENT
[FreeTextEntry1] : Mr. Zelaya is a 35 y/o M with hx of anxiety, seasonal allergies, asthma, GERD and frequent bronchitis presents for pulmonary evaluation. He has residual of sinusitis. \par \par His SOB is multifactorial due to:\par -asthma\par -poor mechanics of breathing\par -anxiety\par \par Problem 1: asthma (stable)\par -continue Bevespi 2 puffs BID\par -Trial of Anoro at 1 inhalation QD \par -continue Qvar 80 2 puffs BID \par -continue Ventolin PRN and before exercise \par -recommended hydration 16 ounces of water prior to exercise \par -Literature provided prior. \par add Singulair 10 mg QHS\par -Inhaler technique reviewed as well as oral hygiene techniques reviewed with patient. Avoidance of cold air, extremes of temperature, rescue inhaler should be used before exercise. Order of medication reviewed with patient. Recommended use of a cool mist humidifier in the bedroom. Gargle and spit after use.\par -Asthma is believed to be caused by inherited (genetic) and environmental factor, but its exact cause is unknown. Asthma may be triggered by allergens, lung infections, or irritants in the air. Asthma triggers are different for each person \par \par Problem 2: poor mechanics of breathing\par -Proper breathing techniques were reviewed with an emphasis of exhalation. Patient instructed to breath in for 1 second and out for four seconds. Patient was encouraged to not talk while walking. \par \par Problem 3: allergies/sinus (s/p fx nose) (Active) \par -Continue Zyrtec 10 mg QHS\par -continue Clarinex 5mg QHS \par -continue Qnasl 1 sniff/nostril BID \par -continue Olopatadine 0.6% 1 sniff BID\par -recommended to try gluten free diet, as blood work reveals slight gluten allergy\par -Environmental measures for allergies were encouraged including mattress and pillow cover, air purifier, and environmental controls.\par \par Problem 4: former smoker \par -discussed staying nicotine-free (9/21/2020)\par -not a candidate for lung cancer screening \par \par problem 5: elevated IgE level\par -recommended as candidate of Xolair, though no need to implement at this point in time \par -Xolair is a recombinant DNA- derived humanized IgG1K monoclonal antibody that selectively binds ot human immunoglobulin E (IgE). Xolair is produced by a Chinese hamster ovary cell suspension culture in nutrient medium containing the antibiotic gentamicin. Gentamicin is not detectable in the final product. Xolair is a sterile, white, preservative free, lyophilized powder contained in a single use vial that is reconstituted with sterile water for suspension. Side effects include: wheezing, tightness of the chest, trouble breathing, hives, skin rash, feeling anxious or light-headed, fainting, warmth or tingling under skin, or swelling of face, lips, or tongue \par \par problem 6: low vitamin D\par -continue taking vitamin D supplements \par -Low vitamin D Has been associated with asthma exacerbations and increased allergic symptoms. The goal based on recent information is maintaining levels between 50-70 and low normal is 30. Recommended 50,000 units every two weeks to once a month depending on the level. \par \par problem 7: GERD\par -Add Pepcid 40 mg QHS \par -Things to avoid including overeating, spicy foods, tight clothing, eating within two hours of bed, this list is not all inclusive. \par -For treatment of reflux, possible options discussed including diet control, H2 blockers, PPIs, as well as coating motility agents discussed as treatment options. Timing of meals and proximity of last meal to sleep were discussed. If symptoms persist, a formal gastrointestinal evaluation is needed.\par -Rule of 2's: Avoid eating too late, too fast, too much, too spicy or within two hours of bedtime \par \par Problem 8: Health Maintenance/COVID19 Precautions:\par - S/p Covid 19 vaccine (Pfizer) x2 \par - Clean your hands often. Wash your hands often with soap and water for at least 20 seconds, especially after blowing your nose, coughing, or sneezing, or having been in a public place.\par - If soap and water are not available, use a hand  that contains at least 60% alcohol.\par - To the extent possible, avoid touching high-touch surfaces in public places - elevator buttons, door handles, handrails, handshaking with people, etc. Use a tissue or your sleeve to cover your hand or finger if you must touch something.\par - Wash your hands after touching surfaces in public places.\par - Avoid touching your face, nose, eyes, etc.\par - Clean and disinfect your home to remove germs: practice routine cleaning of frequently touched surfaces (for example: tables, doorknobs, light switches, handles, desks, toilets, faucets, sinks & cell phones)\par - Avoid crowds, especially in poorly ventilated spaces. Your risk of exposure to respiratory viruses like COVID-19 may increase in crowded, closed-in settings with little air circulation if\par there are people in the crowd who are sick. All patients are recommended to practice social distancing and stay at least 6 feet away from others.\par - Avoid all non-essential travel including plane trips, and especially avoid embarking on cruise ships.\par -If COVID-19 is spreading in your community, take extra measures to put distance between yourself and other people to further reduce your risk of being exposed to this new virus.\par -Stay home as much as possible.\par - Consider ways of getting food brought to your house through family, social, or commercial networks\par -Be aware that the virus has been known to live in the air up to 3 hours post exposure, cardboard up to 24 hours post exposure, copper up to 4 hours post exposure, steel and plastic up to 2-3 days post exposure. Risk of transmission from these surfaces are affected by many variables.\par \par Immune Support Recommendations:\par -OTC Vitamin C 500mg BID \par -OTC Quercetin 250-500mg BID \par -OTC Zinc 75-100mg per day \par -OTC Melatonin 1or 2mg a night \par -OTC Vitamin D 1-4000mg per day\par -Tonic Water 8oz\par -Recommended to Stay Hydrated (At least a gallon/day)\par \par Asthma and COVID19:\par You need to make sure your asthma is under control. This often requires the use of inhaled corticosteroids (and sometimes oral corticosteroids). Inhaled corticosteroids do not likely reduce your immune system’s ability to fight infections, but oral corticosteroids may. It is important to use the steps above to protect yourself to limit your exposure to any respiratory virus. \par \par problem 10: health maintenance \par -recommended to hydrate heavily with water in the morning and refrain from drinking coffee for 90 minutes after waking \par -s/p 2020 influenza vaccination\par -recommended strep pneumonia vaccines: Prevnar-13 vaccine, followed by Pneumo vaccine 23 one year following\par -recommended early intervention for URIs\par -recommended regular osteoporosis evaluations\par -recommended early dermatological evaluations\par -recommended after the age of 50 to the age of 70, colonoscopy every 5 years \par \par Follow up in 6 months with SPI and NiOx\par He is encouraged to call with any questions, concerns, or changes.

## 2021-11-01 NOTE — PROCEDURE
[FreeTextEntry1] : Feno was 42; a normal value being less than 25. Fractional exhaled nitric oxide (FENO) is regarded as a simple, noninvasive method for assessing eosinophilic airway inflammation. Produced by a variety of cells within the lung, nitric oxide (NO) concentrations are generally low in healthy individuals. However, high concentrations of NO appear to be involved in nonspecific host defense mechanisms and chronic inflammatory  diseases such as asthma. The American Thoracic Society (ATS) therefore recommended using FENO to aid in the diagnosis and monitoring of eosinophilic airway inflammation and asthma, and for identifying steroid responsive individuals whose chronic respiratory symptoms may be caused by airway inflammation \par \par Full PFT revealed mild and moderate obstructive flows, with a FEV1 of 3.25L, which is 88% of predicted, mid -low lung volumes, and a diffusion of 33.4, which is 125% of predicted, with a normal flow volume loop \par  \par \par 6 minute walk test reveals a low saturation of 96% with very slight evidence of dyspnea or fatigue; walked 570.5 meters\par

## 2021-12-27 ENCOUNTER — RX RENEWAL (OUTPATIENT)
Age: 36
End: 2021-12-27

## 2022-02-24 ENCOUNTER — RX RENEWAL (OUTPATIENT)
Age: 37
End: 2022-02-24

## 2022-03-21 ENCOUNTER — RX RENEWAL (OUTPATIENT)
Age: 37
End: 2022-03-21

## 2022-04-17 ENCOUNTER — RX RENEWAL (OUTPATIENT)
Age: 37
End: 2022-04-17

## 2022-08-26 ENCOUNTER — APPOINTMENT (OUTPATIENT)
Dept: PULMONOLOGY | Facility: CLINIC | Age: 37
End: 2022-08-26

## 2022-08-26 VITALS
RESPIRATION RATE: 17 BRPM | TEMPERATURE: 96.5 F | BODY MASS INDEX: 25.76 KG/M2 | DIASTOLIC BLOOD PRESSURE: 76 MMHG | HEIGHT: 68 IN | SYSTOLIC BLOOD PRESSURE: 110 MMHG | HEART RATE: 72 BPM | OXYGEN SATURATION: 98 % | WEIGHT: 170 LBS

## 2022-08-26 PROCEDURE — 99214 OFFICE O/P EST MOD 30 MIN: CPT | Mod: 25

## 2022-08-26 PROCEDURE — 95012 NITRIC OXIDE EXP GAS DETER: CPT

## 2022-08-26 PROCEDURE — 94010 BREATHING CAPACITY TEST: CPT

## 2022-08-26 NOTE — HISTORY OF PRESENT ILLNESS
[FreeTextEntry1] : Mr. Zelaya is a 37 year old male presents to the office for a follow up visit for asthma, elevated IgE level, smoking history, low vitamin D, seasonal allergies, wheezing and shortness of breath. His chief complaint is \par \par -he notes energy levels are good \par -he notes exercising  (4x a week) w/o limitation\par -he notes acute pain in right medial lower leg above ankle that radiates to knee when raising leg above hip onset 3-4 mo\par -he notes bowels are good and regular \par -he notes sense of taste and smell are at 70%\par -he denies taking any new medications, vitamins, or supplements \par -he notes good quality of sleep \par -he notes sleeping for 7-8hrs\par -he denies snoring\par -s/p COVID-19 vaccine x 4\par -he notes sinuses are quiet \par -he notes allergy Sx are controlled with Rx\par -he notes weight is stable\par -he notes diet is stable\par \par -he denies any arthralgias, chest pain, chest pressure, chills, constipation, coughing, diarrhea, dizziness, dysphagia, fever, headaches, heartburn, itchy eyes, itchy ears, leg swelling, myalgias, nausea, palpitations, reflux, sweats, vomiting, wheezing or sour taste in the mouth.

## 2022-08-26 NOTE — ADDENDUM
[FreeTextEntry1] : Documented by BO Ramírez acting as a scribe for Dr. Matthwe Domínguez on 08/26/2022 .\par All medical record entries made by the Scribe were at my, Dr. Matthew Domínguez's, direction and personally dictated by me on 08/26/2022. I have reviewed the chart and agree that the record accurately reflects my personal performance of the history, physical exam, assessment and plan. I have also personally directed, reviewed, and agree with the discharge instructions.

## 2022-08-26 NOTE — ASSESSMENT
[FreeTextEntry1] : Mr. Zelaya is a 38 y/o M with hx of anxiety, seasonal allergies, asthma, GERD and frequent bronchitis presents for a follow up pulmonary evaluation. His #1 issue is R inner lower leg \par \par His SOB is multifactorial due to:\par -asthma\par -poor mechanics of breathing\par -anxiety\par \par Problem 1: asthma (stable)\par -continue Bevespi 2 puffs BID\par -Trial of Anoro at 1 inhalation QD \par -continue Qvar 80 2 puffs BID \par -continue Ventolin PRN and before exercise \par -recommended hydration 16 ounces of water prior to exercise \par -Literature provided prior. \par add Singulair 10 mg QHS\par -Inhaler technique reviewed as well as oral hygiene techniques reviewed with patient. Avoidance of cold air, extremes of temperature, rescue inhaler should be used before exercise. Order of medication reviewed with patient. Recommended use of a cool mist humidifier in the bedroom. Gargle and spit after use.\par -Asthma is believed to be caused by inherited (genetic) and environmental factor, but its exact cause is unknown. Asthma may be triggered by allergens, lung infections, or irritants in the air. Asthma triggers are different for each person \par \par Problem 2: poor mechanics of breathing\par -Proper breathing techniques were reviewed with an emphasis of exhalation. Patient instructed to breath in for 1 second and out for four seconds. Patient was encouraged to not talk while walking. \par \par Problem 3: allergies/sinus (s/p fx nose) (quiet)\par -Continue Zyrtec 10 mg QHS\par -continue Clarinex 5mg QHS \par -continue Qnasl 1 sniff/nostril BID \par -continue Olopatadine 0.6% 1 sniff BID\par -recommended to try gluten free diet, as blood work reveals slight gluten allergy\par -Environmental measures for allergies were encouraged including mattress and pillow cover, air purifier, and environmental controls.\par \par Problem 4: former smoker \par -discussed staying nicotine-free (9/21/2020)\par -not a candidate for lung cancer screening \par \par problem 5: elevated IgE level\par -recommended as candidate of Xolair, though no need to implement at this point in time \par -Xolair is a recombinant DNA- derived humanized IgG1K monoclonal antibody that selectively binds ot human immunoglobulin E (IgE). Xolair is produced by a Chinese hamster ovary cell suspension culture in nutrient medium containing the antibiotic gentamicin. Gentamicin is not detectable in the final product. Xolair is a sterile, white, preservative free, lyophilized powder contained in a single use vial that is reconstituted with sterile water for suspension. Side effects include: wheezing, tightness of the chest, trouble breathing, hives, skin rash, feeling anxious or light-headed, fainting, warmth or tingling under skin, or swelling of face, lips, or tongue \par \par problem 6: low vitamin D\par -continue taking vitamin D supplements \par -Low vitamin D Has been associated with asthma exacerbations and increased allergic symptoms. The goal based on recent information is maintaining levels between 50-70 and low normal is 30. Recommended 50,000 units every two weeks to once a month depending on the level. \par \par problem 7: GERD\par -Add Pepcid 40 mg QHS \par -Things to avoid including overeating, spicy foods, tight clothing, eating within two hours of bed, this list is not all inclusive. \par -For treatment of reflux, possible options discussed including diet control, H2 blockers, PPIs, as well as coating motility agents discussed as treatment options. Timing of meals and proximity of last meal to sleep were discussed. If symptoms persist, a formal gastrointestinal evaluation is needed.\par -Rule of 2's: Avoid eating too late, too fast, too much, too spicy or within two hours of bedtime \par \par Problem 8: Health Maintenance/COVID19 Precautions:\par - S/p Covid 19 vaccine (Pfizer) x4\par - Clean your hands often. Wash your hands often with soap and water for at least 20 seconds, especially after blowing your nose, coughing, or sneezing, or having been in a public place.\par - If soap and water are not available, use a hand  that contains at least 60% alcohol.\par - To the extent possible, avoid touching high-touch surfaces in public places - elevator buttons, door handles, handrails, handshaking with people, etc. Use a tissue or your sleeve to cover your hand or finger if you must touch something.\par - Wash your hands after touching surfaces in public places.\par - Avoid touching your face, nose, eyes, etc.\par - Clean and disinfect your home to remove germs: practice routine cleaning of frequently touched surfaces (for example: tables, doorknobs, light switches, handles, desks, toilets, faucets, sinks & cell phones)\par - Avoid crowds, especially in poorly ventilated spaces. Your risk of exposure to respiratory viruses like COVID-19 may increase in crowded, closed-in settings with little air circulation if\par there are people in the crowd who are sick. All patients are recommended to practice social distancing and stay at least 6 feet away from others.\par - Avoid all non-essential travel including plane trips, and especially avoid embarking on cruise ships.\par -If COVID-19 is spreading in your community, take extra measures to put distance between yourself and other people to further reduce your risk of being exposed to this new virus.\par -Stay home as much as possible.\par - Consider ways of getting food brought to your house through family, social, or commercial networks\par -Be aware that the virus has been known to live in the air up to 3 hours post exposure, cardboard up to 24 hours post exposure, copper up to 4 hours post exposure, steel and plastic up to 2-3 days post exposure. Risk of transmission from these surfaces are affected by many variables.\par \par Immune Support Recommendations:\par -OTC Vitamin C 500mg BID \par -OTC Quercetin 250-500mg BID \par -OTC Zinc 75-100mg per day \par -OTC Melatonin 1or 2mg a night \par -OTC Vitamin D 1-4000mg per day\par -Tonic Water 8oz\par -Recommended to Stay Hydrated (At least a gallon/day)\par \par Asthma and COVID19:\par You need to make sure your asthma is under control. This often requires the use of inhaled corticosteroids (and sometimes oral corticosteroids). Inhaled corticosteroids do not likely reduce your immune system’s ability to fight infections, but oral corticosteroids may. It is important to use the steps above to protect yourself to limit your exposure to any respiratory virus. \par \par problem 10: health maintenance \par -recommended orthopedic evaluation (Dr. Steven Elizabeth)\par -recommended Topricin cream\par -recommended to hydrate heavily with water in the morning and refrain from drinking coffee for 90 minutes after waking \par -s/p 2020 influenza vaccination\par -recommended strep pneumonia vaccines: Prevnar-13 vaccine, followed by Pneumo vaccine 23 one year following\par -recommended early intervention for URIs\par -recommended regular osteoporosis evaluations\par -recommended early dermatological evaluations\par -recommended after the age of 50 to the age of 70, colonoscopy every 5 years \par \par Follow up in 6 months with SPI and NiOx\par He is encouraged to call with any questions, concerns, or changes.

## 2022-08-26 NOTE — PROCEDURE
[FreeTextEntry1] : PFT reveals normal flows, with an FEV1 of  3.49L, which is 87% of predicted, with a normal flow volume loop. \par \par FENO was 17; a normal value being less than 25\par Fractional exhaled nitric oxide (FENO) is regarded as a simple, noninvasive method for assessing eosinophilic airway inflammation. Produced by a variety of cells within the lung, nitric oxide (NO) concentrations are generally low in healthy individuals. However, high concentrations of NO appear to be involved in nonspecific host defense mechanisms and chronic inflammatory diseases such as asthma. The American Thoracic Society (ATS) therefore has recommended using FENO to aid in the diagnosis and monitoring of eosinophilic airway inflammation and asthma, and for identifying steroid responsive individuals whose chronic respiratory symptoms may be caused by airway inflammation.

## 2022-09-05 ENCOUNTER — RX RENEWAL (OUTPATIENT)
Age: 37
End: 2022-09-05

## 2022-09-17 ENCOUNTER — RX RENEWAL (OUTPATIENT)
Age: 37
End: 2022-09-17

## 2022-09-23 ENCOUNTER — APPOINTMENT (OUTPATIENT)
Dept: ORTHOPEDIC SURGERY | Facility: CLINIC | Age: 37
End: 2022-09-23

## 2022-09-23 VITALS — HEIGHT: 68 IN | WEIGHT: 165 LBS | BODY MASS INDEX: 25.01 KG/M2

## 2022-09-23 DIAGNOSIS — M79.604 PAIN IN RIGHT LEG: ICD-10-CM

## 2022-09-23 PROCEDURE — 99203 OFFICE O/P NEW LOW 30 MIN: CPT

## 2022-09-23 PROCEDURE — 73590 X-RAY EXAM OF LOWER LEG: CPT | Mod: RT

## 2022-09-23 NOTE — PHYSICAL EXAM
[NL (20)] : dorsiflexion 20 degrees [NL (40)] : plantar flexion 40 degrees [5___] : plantar flexion 5[unfilled]/5 [2+] : dorsalis pedis pulse: 2+ [] : no achilles tendon insertion tenderness [Right] : right tibia/fibula [Weight -] : weightbearing [There are no fractures, subluxations or dislocations. No significant abnormalities are seen] : There are no fractures, subluxations or dislocations. No significant abnormalities are seen

## 2022-09-23 NOTE — DISCUSSION/SUMMARY
[de-identified] : Longstanding position right shin pain\par MRI R lower leg indicated to r/o stress fracture of the tibia\par Low impact activity\par FU after imaging complete

## 2022-09-23 NOTE — HISTORY OF PRESENT ILLNESS
[6] : 6 [0] : 0 [Radiating] : radiating [Sharp] : sharp [Full time] : Work status: full time [de-identified] : 9/23/22: R shin pain that is position when he flexes his hip and knee ongoing since the summer of 2022. He denies injury. He does not run. No low back pain or hip pain. No shooting pain down the leg. No n/t.  [] : Post Surgical Visit: no [FreeTextEntry1] : R ankle [FreeTextEntry3] : 6/2022 [FreeTextEntry5] : 36 Y/O RHD M eval R ankle NKI Pain ongong since june of 2022 with no associated trauma pt states pain only occurs with movement pt states no prior TX  [FreeTextEntry7] : Throughout the R leg  [de-identified] : None [de-identified] :

## 2022-09-29 ENCOUNTER — FORM ENCOUNTER (OUTPATIENT)
Age: 37
End: 2022-09-29

## 2022-09-30 ENCOUNTER — APPOINTMENT (OUTPATIENT)
Dept: MRI IMAGING | Facility: CLINIC | Age: 37
End: 2022-09-30

## 2022-09-30 PROCEDURE — 73718 MRI LOWER EXTREMITY W/O DYE: CPT | Mod: RT

## 2022-10-14 ENCOUNTER — APPOINTMENT (OUTPATIENT)
Dept: ORTHOPEDIC SURGERY | Facility: CLINIC | Age: 37
End: 2022-10-14

## 2022-10-14 VITALS — WEIGHT: 165 LBS | BODY MASS INDEX: 25.01 KG/M2 | HEIGHT: 68 IN

## 2022-10-14 DIAGNOSIS — S86.891A OTHER INJURY OF OTHER MUSCLE(S) AND TENDON(S) AT LOWER LEG LEVEL, RIGHT LEG, INITIAL ENCOUNTER: ICD-10-CM

## 2022-10-14 PROCEDURE — 99214 OFFICE O/P EST MOD 30 MIN: CPT

## 2022-10-14 NOTE — DISCUSSION/SUMMARY
[de-identified] : Discussed position radiating symptoms.  Doubt compartment syndrome.  COnsider neurologic eval

## 2022-10-14 NOTE — DATA REVIEWED
[MRI] : MRI [Right] : of the right [Lower Extremities] : lower extremities [I reviewed the films/CD and agree] : I reviewed the films/CD and agree [FreeTextEntry1] : Impression:  No tear. No fracture.

## 2022-10-14 NOTE — HISTORY OF PRESENT ILLNESS
[6] : 6 [0] : 0 [Radiating] : radiating [Sharp] : sharp [Full time] : Work status: full time [de-identified] : 9/23/22: R shin pain that is position when he flexes his hip and knee ongoing since the summer of 2022. He denies injury. He does not run. No low back pain or hip pain. No shooting pain down the leg. No n/t. \par 10/14/22  f/u R leg  MRO no stress fx Hx of intermittent back spasms [] : Post Surgical Visit: no [FreeTextEntry1] : R ankle [FreeTextEntry3] : 6/2022 [FreeTextEntry5] : 38 Y/O RHD M eval R ankleS/P MRI  NKI Pain ongong since june of 2022 with no associated trauma pt states no noticeable change in condition since last visit  [FreeTextEntry7] : Throughout the R leg  [de-identified] : None [de-identified] :

## 2022-10-24 ENCOUNTER — APPOINTMENT (OUTPATIENT)
Dept: NEUROLOGY | Facility: CLINIC | Age: 37
End: 2022-10-24

## 2022-11-01 ENCOUNTER — APPOINTMENT (OUTPATIENT)
Dept: INTERNAL MEDICINE | Facility: CLINIC | Age: 37
End: 2022-11-01

## 2022-11-10 DIAGNOSIS — R09.81 NASAL CONGESTION: ICD-10-CM

## 2022-11-10 DIAGNOSIS — R05.9 COUGH, UNSPECIFIED: ICD-10-CM

## 2022-11-10 DIAGNOSIS — J01.80 OTHER ACUTE SINUSITIS: ICD-10-CM

## 2022-11-10 RX ORDER — AZELASTINE HYDROCHLORIDE 137 UG/1
0.1 SPRAY, METERED NASAL TWICE DAILY
Qty: 1 | Refills: 1 | Status: ACTIVE | COMMUNITY
Start: 2022-11-10 | End: 1900-01-01

## 2022-11-10 RX ORDER — DESLORATADINE 5 MG/1
5 TABLET ORAL DAILY
Qty: 30 | Refills: 3 | Status: ACTIVE | COMMUNITY
Start: 2022-11-10 | End: 1900-01-01

## 2022-11-10 RX ORDER — MONTELUKAST 10 MG/1
10 TABLET, FILM COATED ORAL
Qty: 1 | Refills: 5 | Status: ACTIVE | COMMUNITY
Start: 2022-11-10 | End: 1900-01-01

## 2022-11-28 ENCOUNTER — NON-APPOINTMENT (OUTPATIENT)
Age: 37
End: 2022-11-28

## 2023-01-27 ENCOUNTER — TRANSCRIPTION ENCOUNTER (OUTPATIENT)
Age: 38
End: 2023-01-27

## 2023-02-10 ENCOUNTER — TRANSCRIPTION ENCOUNTER (OUTPATIENT)
Age: 38
End: 2023-02-10

## 2023-02-10 RX ORDER — GLYCOPYRROLATE AND FORMOTEROL FUMARATE 9; 4.8 UG/1; UG/1
9-4.8 AEROSOL, METERED RESPIRATORY (INHALATION)
Qty: 3 | Refills: 1 | Status: DISCONTINUED | COMMUNITY
Start: 2017-11-15 | End: 2023-02-10

## 2023-02-17 ENCOUNTER — APPOINTMENT (OUTPATIENT)
Dept: PULMONOLOGY | Facility: CLINIC | Age: 38
End: 2023-02-17

## 2023-03-02 ENCOUNTER — RX RENEWAL (OUTPATIENT)
Age: 38
End: 2023-03-02

## 2023-03-02 RX ORDER — MOMETASONE 50 UG/1
50 SPRAY, METERED NASAL TWICE DAILY
Qty: 1 | Refills: 5 | Status: ACTIVE | COMMUNITY
Start: 2020-04-14 | End: 1900-01-01

## 2023-03-14 ENCOUNTER — APPOINTMENT (OUTPATIENT)
Dept: DERMATOLOGY | Facility: CLINIC | Age: 38
End: 2023-03-14

## 2023-03-19 ENCOUNTER — RX RENEWAL (OUTPATIENT)
Age: 38
End: 2023-03-19

## 2023-04-14 ENCOUNTER — APPOINTMENT (OUTPATIENT)
Dept: ORTHOPEDIC SURGERY | Facility: CLINIC | Age: 38
End: 2023-04-14

## 2023-04-21 ENCOUNTER — APPOINTMENT (OUTPATIENT)
Dept: PULMONOLOGY | Facility: CLINIC | Age: 38
End: 2023-04-21
Payer: COMMERCIAL

## 2023-04-21 ENCOUNTER — NON-APPOINTMENT (OUTPATIENT)
Age: 38
End: 2023-04-21

## 2023-04-21 VITALS
DIASTOLIC BLOOD PRESSURE: 70 MMHG | BODY MASS INDEX: 24.71 KG/M2 | HEIGHT: 68 IN | WEIGHT: 163 LBS | RESPIRATION RATE: 17 BRPM | TEMPERATURE: 97.2 F | HEART RATE: 73 BPM | SYSTOLIC BLOOD PRESSURE: 110 MMHG | OXYGEN SATURATION: 98 %

## 2023-04-21 DIAGNOSIS — R09.82 POSTNASAL DRIP: ICD-10-CM

## 2023-04-21 DIAGNOSIS — R06.02 SHORTNESS OF BREATH: ICD-10-CM

## 2023-04-21 DIAGNOSIS — F41.9 ANXIETY DISORDER, UNSPECIFIED: ICD-10-CM

## 2023-04-21 DIAGNOSIS — R76.8 OTHER SPECIFIED ABNORMAL IMMUNOLOGICAL FINDINGS IN SERUM: ICD-10-CM

## 2023-04-21 DIAGNOSIS — M75.42 IMPINGEMENT SYNDROME OF LEFT SHOULDER: ICD-10-CM

## 2023-04-21 PROCEDURE — 94010 BREATHING CAPACITY TEST: CPT

## 2023-04-21 PROCEDURE — 99214 OFFICE O/P EST MOD 30 MIN: CPT | Mod: 25

## 2023-04-21 PROCEDURE — 95012 NITRIC OXIDE EXP GAS DETER: CPT

## 2023-04-21 NOTE — ADDENDUM
[FreeTextEntry1] : Documented by Joanna Perez acting as a scribe for Dr. Matthew Domínguez on 04/21/2023 \par \par All medical record entries made by the Scribe were at my, Dr. Matthew Domínguez's, direction and personally dictated by me on 04/21/2023 . I have reviewed the chart and agree that the record accurately reflects my personal performance of the history, physical exam, assessment and plan. I have also personally directed, reviewed, and agree with the discharge instructions.

## 2023-04-21 NOTE — HISTORY OF PRESENT ILLNESS
[FreeTextEntry1] : Mr. Zelaya is a 37 year old male presents to the office for a follow up visit for asthma, elevated IgE level, smoking history, low vitamin D, seasonal allergies, wheezing and shortness of breath. His chief complaint is \par - he notes he has beeen feeling great in general \par - he has been having less and less allergy symptoms \par - no heart burn / reflux \par - he notes once he gets his knee up to a certain height it feels like its being stabbed and he feels pain\par - has been sleeping well 7-8 hrs a night \par - he notes he switched from Bevespi to stiolto and he feels it has been better for him. He takes one puff AM then PM\par -He denies any visual issues, headaches, nausea, vomiting, fever, chills, sweats, chest pains, chest pressure, diarrhea, constipation, dysphagia, myalgia, dizziness, leg swelling, leg pain, itchy eyes, itchy ears, heartburn, reflux, or sour taste in the mouth.

## 2023-04-21 NOTE — PROCEDURE
[FreeTextEntry1] : PFT reveals normal flows, with an FEV1 of  3.72 L, which is  93% of predicted, with normal flow volume loop \par \par \par FENO was 29; normal value being less than 25\par Fractional exhaled nitric oxide (FENO) is regarded as a simple, noninvasive method for assessing eosinophilic airway inflammation. Produced by a variety of cells within the lung, nitric oxide (NO) concentrations are generally low in healthy individuals. However, high concentrations of NO appear to be involved in nonspecific host defense mechanisms and chronic inflammatory diseases such as asthma. The American Thoracic Society (ATS) therefore has recommended using FENO to aid in the diagnosis and monitoring of eosinophilic airway inflammation and asthma, and for identifying steroid responsive individuals whose chronic respiratory symptoms may be airway inflammation.

## 2023-04-21 NOTE — ASSESSMENT
[FreeTextEntry1] : Mr. Zelaya is a 38 y/o M with hx of anxiety, seasonal allergies, asthma, GERD and frequent bronchitis presents for pulmonary evaluation. He has residual of sinusitis. (Still)\par \par His SOB is multifactorial due to:\par -asthma\par -poor mechanics of breathing\par -anxiety\par \par Problem 1: asthma (stable)\par -add Stiolto 2 puffs QD \par -continue Qvar 80 2 puffs BID \par -continue Ventolin PRN and before exercise \par -recommended hydration 16 ounces of water prior to exercise \par -Literature provided prior. \par add Singulair 10 mg QHS\par -Inhaler technique reviewed as well as oral hygiene techniques reviewed with patient. Avoidance of cold air, extremes of temperature, rescue inhaler should be used before exercise. Order of medication reviewed with patient. Recommended use of a cool mist humidifier in the bedroom. Gargle and spit after use.\par -Asthma is believed to be caused by inherited (genetic) and environmental factor, but its exact cause is unknown. Asthma may be triggered by allergens, lung infections, or irritants in the air. Asthma triggers are different for each person \par \par Problem 2: poor mechanics of breathing\par -Proper breathing techniques were reviewed with an emphasis of exhalation. Patient instructed to breath in for 1 second and out for four seconds. Patient was encouraged to not talk while walking. \par \par Problem 3: allergies/sinus (s/p fx nose) (Active) \par -Continue Zyrtec 10 mg QHS\par -continue Clarinex 5mg QHS \par -continue Qnasl 1 sniff/nostril BID \par -continue Olopatadine 0.6% 1 sniff BID\par -recommended to try gluten free diet, as blood work reveals slight gluten allergy\par -Environmental measures for allergies were encouraged including mattress and pillow cover, air purifier, and environmental controls.\par \par Problem 4: former smoker \par -discussed staying nicotine-free (9/21/2020)\par -not a candidate for lung cancer screening \par \par problem 5: elevated IgE level\par -recommended as candidate of Xolair, though no need to implement at this point in time \par -Xolair is a recombinant DNA- derived humanized IgG1K monoclonal antibody that selectively binds ot human immunoglobulin E (IgE). Xolair is produced by a Chinese hamster ovary cell suspension culture in nutrient medium containing the antibiotic gentamicin. Gentamicin is not detectable in the final product. Xolair is a sterile, white, preservative free, lyophilized powder contained in a single use vial that is reconstituted with sterile water for suspension. Side effects include: wheezing, tightness of the chest, trouble breathing, hives, skin rash, feeling anxious or light-headed, fainting, warmth or tingling under skin, or swelling of face, lips, or tongue \par \par problem 6: low vitamin D\par -continue taking vitamin D supplements \par -Low vitamin D Has been associated with asthma exacerbations and increased allergic symptoms. The goal based on recent information is maintaining levels between 50-70 and low normal is 30. Recommended 50,000 units every two weeks to once a month depending on the level. \par \par problem 7: GERD\par -Add Pepcid 40 mg QHS \par -Things to avoid including overeating, spicy foods, tight clothing, eating within two hours of bed, this list is not all inclusive. \par -For treatment of reflux, possible options discussed including diet control, H2 blockers, PPIs, as well as coating motility agents discussed as treatment options. Timing of meals and proximity of last meal to sleep were discussed. If symptoms persist, a formal gastrointestinal evaluation is needed.\par -Rule of 2's: Avoid eating too late, too fast, too much, too spicy or within two hours of bedtime \par \par Problem 8: Health Maintenance/COVID19 Precautions: \par - S/p Covid 19 vaccine (Pfizer) x2 \par - Clean your hands often. Wash your hands often with soap and water for at least 20 seconds, especially after blowing your nose, coughing, or sneezing, or having been in a public place.\par - If soap and water are not available, use a hand  that contains at least 60% alcohol.\par - To the extent possible, avoid touching high-touch surfaces in public places - elevator buttons, door handles, handrails, handshaking with people, etc. Use a tissue or your sleeve to cover your hand or finger if you must touch something.\par - Wash your hands after touching surfaces in public places.\par - Avoid touching your face, nose, eyes, etc.\par - Clean and disinfect your home to remove germs: practice routine cleaning of frequently touched surfaces (for example: tables, doorknobs, light switches, handles, desks, toilets, faucets, sinks & cell phones)\par - Avoid crowds, especially in poorly ventilated spaces. Your risk of exposure to respiratory viruses like COVID-19 may increase in crowded, closed-in settings with little air circulation if\par there are people in the crowd who are sick. All patients are recommended to practice social distancing and stay at least 6 feet away from others.\par - Avoid all non-essential travel including plane trips, and especially avoid embarking on cruise ships.\par -If COVID-19 is spreading in your community, take extra measures to put distance between yourself and other people to further reduce your risk of being exposed to this new virus.\par -Stay home as much as possible.\par - Consider ways of getting food brought to your house through family, social, or commercial networks\par -Be aware that the virus has been known to live in the air up to 3 hours post exposure, cardboard up to 24 hours post exposure, copper up to 4 hours post exposure, steel and plastic up to 2-3 days post exposure. Risk of transmission from these surfaces are affected by many variables.\par \par Immune Support Recommendations:\par -OTC Vitamin C 500mg BID \par -OTC Quercetin 250-500mg BID \par -OTC Zinc 75-100mg per day \par -OTC Melatonin 1or 2mg a night \par -OTC Vitamin D 1-4000mg per day\par -Tonic Water 8oz\par -Recommended to Stay Hydrated (At least a gallon/day)\par \par Asthma and COVID19:\par You need to make sure your asthma is under control. This often requires the use of inhaled corticosteroids (and sometimes oral corticosteroids). Inhaled corticosteroids do not likely reduce your immune system’s ability to fight infections, but oral corticosteroids may. It is important to use the steps above to protect yourself to limit your exposure to any respiratory virus. \par \par problem 10: health maintenance \par - (EBM) Foot \par -recommended to hydrate heavily with water in the morning and refrain from drinking coffee for 90 minutes after waking \par -s/p 2020 influenza vaccination\par -recommended strep pneumonia vaccines: Prevnar-13 vaccine, followed by Pneumo vaccine 23 one year following\par -recommended early intervention for URIs\par -recommended regular osteoporosis evaluations\par -recommended early dermatological evaluations\par -recommended after the age of 50 to the age of 70, colonoscopy every 5 years \par \par Follow up in 6 months with SPI and NiOx\par He is encouraged to call with any questions, concerns, or changes.

## 2023-05-21 ENCOUNTER — RX RENEWAL (OUTPATIENT)
Age: 38
End: 2023-05-21

## 2023-06-16 ENCOUNTER — APPOINTMENT (OUTPATIENT)
Dept: DERMATOLOGY | Facility: CLINIC | Age: 38
End: 2023-06-16
Payer: COMMERCIAL

## 2023-06-16 DIAGNOSIS — D48.7 NEOPLASM OF UNCERTAIN BEHAVIOR OF OTHER SPECIFIED SITES: ICD-10-CM

## 2023-06-16 DIAGNOSIS — D48.5 NEOPLASM OF UNCERTAIN BEHAVIOR OF SKIN: ICD-10-CM

## 2023-06-16 DIAGNOSIS — L82.1 OTHER SEBORRHEIC KERATOSIS: ICD-10-CM

## 2023-06-16 DIAGNOSIS — D18.01 HEMANGIOMA OF SKIN AND SUBCUTANEOUS TISSUE: ICD-10-CM

## 2023-06-16 DIAGNOSIS — Z12.83 ENCOUNTER FOR SCREENING FOR MALIGNANT NEOPLASM OF SKIN: ICD-10-CM

## 2023-06-16 PROCEDURE — 11103 TANGNTL BX SKIN EA SEP/ADDL: CPT | Mod: 59

## 2023-06-16 PROCEDURE — 11102 TANGNTL BX SKIN SINGLE LES: CPT | Mod: 59

## 2023-06-16 PROCEDURE — 99213 OFFICE O/P EST LOW 20 MIN: CPT | Mod: 25

## 2023-06-16 NOTE — HISTORY OF PRESENT ILLNESS
[FreeTextEntry1] : spots  [de-identified] : SINDHU BUSTAMANTE is a 37 year old M who present for f/u as below. \par \par FBSE.  Spots scattered on body x years. Asymptomatic and unchanged. No alleviating/aggravating factors. Never been treated.\par No other changing or concerning lesions. \par No itchy, growing, bleeding, painful or changing moles. 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

## 2023-06-16 NOTE — ASSESSMENT
[FreeTextEntry1] : Neoplasm of uncertain behavior x1, midback & R upper abdomen\par - Rule out melanoma vs. dysplastic for both\par - Recommend tangential shave biopsy x2 for definitive diagnosis.  \par - After informed consent was obtained, using Hibiclens for cleansing and 1% Lidocaine with epinephrine for anesthetic, with sterile technique a shave biopsy x2 was used to obtain a biopsy specimen of the lesion. Hemostasis was obtained with aluminum chloride.  Vaseline was applied, and wound care instructions provided. The specimen was labeled and sent to pathology for evaluation. The procedure was well tolerated without complication.  \par - The patient will be contacted with biopsy results\par \par Multiple melanocytic nevi, benign \par - Monitor moles for changes \par - Recommend wearing hats and sun protective clothing or OTC sunscreen products (SPF 30+, broad band, EltaMD/La Roche Posay/Neutrogena) daily on your face and entire body (apply sunscreen atleast 30 minutes prior to going outside). Reapply sunscreen every 2 hours when outside.\par \par Angiomas (benign noncancerous blood vessel growths)\par - Clinically benign red or purple blood vessel growths\par - No treatment warranted\par \par Seborrheic Keratosis\par - These growths are benign\par - Related to genetics - these lesions run in families; NOT related to sun exposure\par - No treatment warranted unless inflamed; can use OTC Sarna lotion PRN itch\par \par Screening exam for skin cancer \par - TBSE performed today\par - Advised sun protection. \par Recommended OTC sunscreen products (EltaMD/Neutrogena/La Roche Posay), including SPF30+ with broadband UV protection as well as proper use. \par Discussed OTC sun protective clothing\par - Counseled patient to monitor for changes, including mole monitoring and self-skin exams\par \par

## 2023-06-16 NOTE — PHYSICAL EXAM
[Alert] : alert [Oriented x 3] : ~L oriented x 3 [Well Nourished] : well nourished [Conjunctiva Non-injected] : conjunctiva non-injected [No Visual Lymphadenopathy] : no visual  lymphadenopathy [No Clubbing] : no clubbing [No Edema] : no edema [No Bromhidrosis] : no bromhidrosis [No Chromhidrosis] : no chromhidrosis [Full Body Skin Exam Performed] : performed [FreeTextEntry3] : General: Alert and oriented, in NAD. \par All of the following were examined and were within normal limits, except as noted:  \par Scalp:\par Face, including eyelids, nose, lips, ears, oropharynx:\par Neck:\par Chest/Back/Abdomen:\par Bilateral Arms/Hands:\par Bilateral Legs/Feet:\par Buttocks, Genitalia, Anus/perineum:  	\par Hair, Nails, Oral Mucosa, Eyes:  \par - brown homogenous macules and papules on face, L ear, body\par - red papules on body\par - stuckon waxy brown papule L upper back \par irregular brown macules midback and R upper abdomen

## 2023-06-23 ENCOUNTER — NON-APPOINTMENT (OUTPATIENT)
Age: 38
End: 2023-06-23

## 2023-06-23 ENCOUNTER — APPOINTMENT (OUTPATIENT)
Dept: INTERNAL MEDICINE | Facility: CLINIC | Age: 38
End: 2023-06-23
Payer: COMMERCIAL

## 2023-06-23 VITALS
HEART RATE: 88 BPM | SYSTOLIC BLOOD PRESSURE: 118 MMHG | RESPIRATION RATE: 16 BRPM | WEIGHT: 165 LBS | OXYGEN SATURATION: 98 % | BODY MASS INDEX: 25.01 KG/M2 | DIASTOLIC BLOOD PRESSURE: 70 MMHG | HEIGHT: 68 IN | TEMPERATURE: 98.1 F

## 2023-06-23 DIAGNOSIS — Z00.00 ENCOUNTER FOR GENERAL ADULT MEDICAL EXAMINATION W/OUT ABNORMAL FINDINGS: ICD-10-CM

## 2023-06-23 PROCEDURE — 93000 ELECTROCARDIOGRAM COMPLETE: CPT | Mod: 59

## 2023-06-23 PROCEDURE — 99385 PREV VISIT NEW AGE 18-39: CPT | Mod: 25

## 2023-06-23 RX ORDER — AMOXICILLIN AND CLAVULANATE POTASSIUM 875; 125 MG/1; MG/1
875-125 TABLET, COATED ORAL
Qty: 20 | Refills: 0 | Status: DISCONTINUED | COMMUNITY
Start: 2022-11-10 | End: 2023-06-23

## 2023-06-23 RX ORDER — PROMETHAZINE HYDROCHLORIDE AND DEXTROMETHORPHAN HYDROBROMIDE ORAL SOLUTION 15; 6.25 MG/5ML; MG/5ML
6.25-15 SOLUTION ORAL
Qty: 240 | Refills: 0 | Status: DISCONTINUED | COMMUNITY
Start: 2022-11-10 | End: 2023-06-23

## 2023-06-23 NOTE — HEALTH RISK ASSESSMENT
[Very Good] : ~his/her~  mood as very good [2 - 4 times a month (2 pts)] : 2-4 times a month (2 points) [No falls in past year] : Patient reported no falls in the past year [0] : 2) Feeling down, depressed, or hopeless: Not at all (0) [PHQ-2 Negative - No further assessment needed] : PHQ-2 Negative - No further assessment needed [[id number]] : 0

## 2023-06-23 NOTE — ASSESSMENT
[FreeTextEntry1] : 37 year- history of asthma- stable and followed by pulmonary\par GERD- lifestyle modification and doing well.\par check labs \par Stressed diet and exercise

## 2023-06-26 LAB
25(OH)D3 SERPL-MCNC: 25.7 NG/ML
ALBUMIN SERPL ELPH-MCNC: 4.8 G/DL
ALP BLD-CCNC: 54 U/L
ALT SERPL-CCNC: 18 U/L
ANION GAP SERPL CALC-SCNC: 14 MMOL/L
APPEARANCE: CLEAR
AST SERPL-CCNC: 18 U/L
BACTERIA: NEGATIVE /HPF
BILIRUB SERPL-MCNC: 0.6 MG/DL
BILIRUBIN URINE: NEGATIVE
BLOOD URINE: NEGATIVE
BUN SERPL-MCNC: 16 MG/DL
CALCIUM SERPL-MCNC: 9.7 MG/DL
CAST: 0 /LPF
CHLORIDE SERPL-SCNC: 103 MMOL/L
CHOLEST SERPL-MCNC: 230 MG/DL
CO2 SERPL-SCNC: 22 MMOL/L
COLOR: YELLOW
CREAT SERPL-MCNC: 1.01 MG/DL
EGFR: 97 ML/MIN/1.73M2
EPITHELIAL CELLS: 0 /HPF
ESTIMATED AVERAGE GLUCOSE: 103 MG/DL
FOLATE SERPL-MCNC: 19.7 NG/ML
GLUCOSE QUALITATIVE U: NEGATIVE MG/DL
GLUCOSE SERPL-MCNC: 96 MG/DL
HBA1C MFR BLD HPLC: 5.2 %
HDLC SERPL-MCNC: 72 MG/DL
KETONES URINE: NEGATIVE MG/DL
LDLC SERPL CALC-MCNC: 142 MG/DL
LEUKOCYTE ESTERASE URINE: NEGATIVE
MICROSCOPIC-UA: NORMAL
NITRITE URINE: NEGATIVE
NONHDLC SERPL-MCNC: 158 MG/DL
PH URINE: 6
POTASSIUM SERPL-SCNC: 4.3 MMOL/L
PROT SERPL-MCNC: 7.3 G/DL
PROTEIN URINE: NEGATIVE MG/DL
RED BLOOD CELLS URINE: 0 /HPF
SODIUM SERPL-SCNC: 139 MMOL/L
SPECIFIC GRAVITY URINE: 1.01
TRIGL SERPL-MCNC: 78 MG/DL
TSH SERPL-ACNC: 1.72 UIU/ML
UROBILINOGEN URINE: 0.2 MG/DL
VIT B12 SERPL-MCNC: 432 PG/ML
WHITE BLOOD CELLS URINE: 0 /HPF

## 2023-07-11 ENCOUNTER — NON-APPOINTMENT (OUTPATIENT)
Age: 38
End: 2023-07-11

## 2023-07-18 LAB — CORE LAB BIOPSY: NORMAL

## 2023-08-16 ENCOUNTER — RX RENEWAL (OUTPATIENT)
Age: 38
End: 2023-08-16

## 2023-09-11 ENCOUNTER — RX RENEWAL (OUTPATIENT)
Age: 38
End: 2023-09-11

## 2023-09-15 ENCOUNTER — APPOINTMENT (OUTPATIENT)
Dept: DERMATOLOGY | Facility: CLINIC | Age: 38
End: 2023-09-15
Payer: COMMERCIAL

## 2023-09-15 DIAGNOSIS — D22.9 MELANOCYTIC NEVI, UNSPECIFIED: ICD-10-CM

## 2023-09-15 DIAGNOSIS — D23.9 OTHER BENIGN NEOPLASM OF SKIN, UNSPECIFIED: ICD-10-CM

## 2023-09-15 DIAGNOSIS — D23.5 OTHER BENIGN NEOPLASM OF SKIN OF TRUNK: ICD-10-CM

## 2023-09-15 PROCEDURE — 99213 OFFICE O/P EST LOW 20 MIN: CPT

## 2023-12-11 DIAGNOSIS — D84.9 IMMUNODEFICIENCY, UNSPECIFIED: ICD-10-CM

## 2023-12-11 RX ORDER — BUDESONIDE 0.5 MG/2ML
0.5 INHALANT ORAL TWICE DAILY
Qty: 2 | Refills: 1 | Status: ACTIVE | COMMUNITY
Start: 2023-12-11 | End: 1900-01-01

## 2023-12-11 RX ORDER — LEVALBUTEROL HYDROCHLORIDE 0.63 MG/3ML
0.63 SOLUTION RESPIRATORY (INHALATION)
Qty: 4 | Refills: 2 | Status: ACTIVE | COMMUNITY
Start: 2023-12-11 | End: 1900-01-01

## 2023-12-12 ENCOUNTER — NON-APPOINTMENT (OUTPATIENT)
Age: 38
End: 2023-12-12

## 2024-02-26 ENCOUNTER — RX RENEWAL (OUTPATIENT)
Age: 39
End: 2024-02-26

## 2024-02-26 RX ORDER — TIOTROPIUM BROMIDE AND OLODATEROL 3.124; 2.736 UG/1; UG/1
2.5-2.5 SPRAY, METERED RESPIRATORY (INHALATION)
Qty: 3 | Refills: 1 | Status: ACTIVE | COMMUNITY
Start: 2023-02-10 | End: 1900-01-01

## 2024-02-26 RX ORDER — BECLOMETHASONE DIPROPIONATE HFA 80 UG/1
80 AEROSOL, METERED RESPIRATORY (INHALATION) TWICE DAILY
Qty: 31.8 | Refills: 1 | Status: ACTIVE | COMMUNITY
Start: 2020-09-21 | End: 1900-01-01

## 2024-02-26 RX ORDER — AZELASTINE HYDROCHLORIDE 137 UG/1
137 SPRAY, METERED NASAL
Qty: 3 | Refills: 1 | Status: ACTIVE | COMMUNITY
Start: 2023-05-21 | End: 1900-01-01

## 2024-03-08 ENCOUNTER — NON-APPOINTMENT (OUTPATIENT)
Age: 39
End: 2024-03-08

## 2024-03-08 RX ORDER — NYSTATIN 100000 [USP'U]/ML
100000 SUSPENSION ORAL 3 TIMES DAILY
Qty: 1 | Refills: 0 | Status: ACTIVE | COMMUNITY
Start: 2024-03-08 | End: 1900-01-01

## 2024-04-12 ENCOUNTER — APPOINTMENT (OUTPATIENT)
Dept: INTERNAL MEDICINE | Facility: CLINIC | Age: 39
End: 2024-04-12
Payer: COMMERCIAL

## 2024-04-12 ENCOUNTER — APPOINTMENT (OUTPATIENT)
Dept: PULMONOLOGY | Facility: CLINIC | Age: 39
End: 2024-04-12
Payer: COMMERCIAL

## 2024-04-12 VITALS
HEIGHT: 68 IN | SYSTOLIC BLOOD PRESSURE: 120 MMHG | BODY MASS INDEX: 25.01 KG/M2 | TEMPERATURE: 97.9 F | RESPIRATION RATE: 16 BRPM | OXYGEN SATURATION: 99 % | DIASTOLIC BLOOD PRESSURE: 86 MMHG | WEIGHT: 165 LBS | HEART RATE: 87 BPM

## 2024-04-12 VITALS
RESPIRATION RATE: 14 BRPM | BODY MASS INDEX: 25.16 KG/M2 | TEMPERATURE: 98.9 F | DIASTOLIC BLOOD PRESSURE: 80 MMHG | HEIGHT: 68 IN | SYSTOLIC BLOOD PRESSURE: 120 MMHG | WEIGHT: 166 LBS | HEART RATE: 112 BPM | OXYGEN SATURATION: 98 %

## 2024-04-12 DIAGNOSIS — K42.9 UMBILICAL HERNIA W/OUT OBSTRUCTION OR GANGRENE: ICD-10-CM

## 2024-04-12 DIAGNOSIS — J30.2 OTHER SEASONAL ALLERGIC RHINITIS: ICD-10-CM

## 2024-04-12 DIAGNOSIS — R79.89 OTHER SPECIFIED ABNORMAL FINDINGS OF BLOOD CHEMISTRY: ICD-10-CM

## 2024-04-12 DIAGNOSIS — K21.9 GASTRO-ESOPHAGEAL REFLUX DISEASE W/OUT ESOPHAGITIS: ICD-10-CM

## 2024-04-12 DIAGNOSIS — J45.909 UNSPECIFIED ASTHMA, UNCOMPLICATED: ICD-10-CM

## 2024-04-12 DIAGNOSIS — B37.0 CANDIDAL STOMATITIS: ICD-10-CM

## 2024-04-12 PROCEDURE — 99215 OFFICE O/P EST HI 40 MIN: CPT

## 2024-04-12 PROCEDURE — 99213 OFFICE O/P EST LOW 20 MIN: CPT

## 2024-04-12 RX ORDER — FLUTICASONE FUROATE, UMECLIDINIUM BROMIDE AND VILANTEROL TRIFENATATE 200; 62.5; 25 UG/1; UG/1; UG/1
200-62.5-25 POWDER RESPIRATORY (INHALATION)
Qty: 1 | Refills: 3 | Status: ACTIVE | COMMUNITY
Start: 2024-04-12 | End: 1900-01-01

## 2024-04-12 NOTE — REVIEW OF SYSTEMS
[Seasonal Allergies] : seasonal allergies [Negative] : HEENT [TextBox_69] : bloating x 1 week, slight protrusion of umbilicus

## 2024-04-12 NOTE — HISTORY OF PRESENT ILLNESS
[FreeTextEntry1] : LAST VISIT 4/21/2023 Mr. Zelaya is a 37 year old male presents to the office for a follow up visit for asthma, elevated IgE level, smoking history, low vitamin D, seasonal allergies, wheezing and shortness of breath. His chief complaint is  - he notes he has been feeling great in general  - he has been having less and less allergy symptoms  - no heart burn / reflux  - he notes once he gets his knee up to a certain height it feels like its being stabbed and he feels pain - has been sleeping well 7-8 hrs a night  - he notes he switched from Bevespi to stiolto and he feels it has been better for him. He takes one puff AM then PM -He denies any visual issues, headaches, nausea, vomiting, fever, chills, sweats, chest pains, chest pressure, diarrhea, constipation, dysphagia, myalgia, dizziness, leg swelling, leg pain, itchy eyes, itchy ears, heartburn, reflux, or sour taste in the mouth.  TODAY'S VISIT 4/12/2024 Mr. Zelaya is a 37 year old male presents to the office for a follow up visit for asthma, elevated IgE level, smoking history, low vitamin D, seasonal allergies, wheezing and shortness of breath. His chief complaint is   -reports he had oral thrush with irritation in roof of mouth, white coating on tongue in March and was treated with Nystatin and salt water rinses -reports he has been on QVAR and Stiolto inhalers and had no issues with it; have been working effectively, but QVAR inhaler is costly -reports he would be interested in taking one inhaler vs two inhalers per day given cost and convenience -reports bloating x 1 week and his umbilicus is more protruded; denies pain; bowel movement regular; seeing his PCP today  -reports he uses Azelasine nasal spray as needed, not daily due to it causing drowsiness -reports he stopped eating spicy food that caused reflux in the past; does not need reflux meds -exercises: weight training, yoga, peloton  denies any headaches, nausea, vomiting, fever, chills, sweats, chest pain, chest pressure, palpitations, coughing, wheezing, fatigue, constipation, dysphagia, dizziness, leg swelling, leg pain, itchy eyes, itchy ears, heartburn, reflux or sour taste in the mouth.

## 2024-04-12 NOTE — ASSESSMENT
[FreeTextEntry1] : Mr. Zelaya is a 39 y/o M with hx of anxiety, seasonal allergies, asthma, GERD and frequent bronchitis presents for follow up pulmonary evaluation for recently treated oral thrush.   His SOB is multifactorial due to: -asthma -poor mechanics of breathing -anxiety  Problem 1: asthma (stable) -Trial of Trelegy 200 inhaler 1 inhalation daily (rinse and gargle, brush teeth after use) -D/C Stiolto 2 puffs QD  -D/C Qvar 80 2 puffs BID  -continue Ventolin PRN and before exercise  -recommended hydration 16 ounces of water prior to exercise  -Asthma is believed to be caused by inherited (genetic) and environmental factor, but its exact cause is unknown. Asthma may be triggered by allergens, lung infections, or irritants in the air. Asthma triggers are different for each person   Problem 1a: oral thrush (resolved) -s/p Nystatin 100,000 u/ml. Swish and swallow 5 ml 3xday for 7 days.  -s/p salt water rinses -Educated on possible reasons for thrush including increased blood glucose and immunity; advised to f/u with PCP  Problem 2: allergies/sinus (s/p fx nose)  -continue Azelastine BID PRN -Recommended nasal irrigation or nasal rinse as alternative due to s/e of drowsiness from nasal sprays and OTC antihistamines  -recommended to try gluten free diet, as blood work reveals slight gluten allergy -Environmental measures for allergies were encouraged including mattress and pillow cover, air purifier, and environmental controls.  Problem 3: former smoker  -discussed staying nicotine-free (9/21/2020) -not a candidate for lung cancer screening   problem 4: elevated IgE level -recommended as candidate of Xolair, though no need to implement at this point in time  -Xolair is a recombinant DNA- derived humanized IgG1K monoclonal antibody that selectively binds ot human immunoglobulin E (IgE). Xolair is produced by a Chinese hamster ovary cell suspension culture in nutrient medium containing the antibiotic gentamicin. Gentamicin is not detectable in the final product. Xolair is a sterile, white, preservative free, lyophilized powder contained in a single use vial that is reconstituted with sterile water for suspension. Side effects include: wheezing, tightness of the chest, trouble breathing, hives, skin rash, feeling anxious or light-headed, fainting, warmth or tingling under skin, or swelling of face, lips, or tongue   problem 5: low vitamin D -continue taking vitamin D supplements  -Low vitamin D Has been associated with asthma exacerbations and increased allergic symptoms. The goal based on recent information is maintaining levels between 50-70 and low normal is 30. Recommended 50,000 units every two weeks to once a month depending on the level.   problem 6: GERD -diet controlled  -not on meds -Things to avoid including overeating, spicy foods, tight clothing, eating within two hours of bed, this list is not all inclusive.  -For treatment of reflux, possible options discussed including diet control, H2 blockers, PPIs, as well as coating motility agents discussed as treatment options. Timing of meals and proximity of last meal to sleep were discussed. If symptoms persist, a formal gastrointestinal evaluation is needed. -Rule of 2's: Avoid eating too late, too fast, too much, too spicy or within two hours of bedtime   Problem 7: Mild protrusion of umbilicus r/o hernia  -f/u to see PCP, Dr. Malhotra  Follow up with Dr. Domínguez in July 24 with PFTs He is encouraged to call with any questions, concerns, or changes.

## 2024-04-12 NOTE — PHYSICAL EXAM
[No Acute Distress] : no acute distress [Well Nourished] : well nourished [Well Developed] : well developed [Well-Appearing] : well-appearing [Normal Sclera/Conjunctiva] : normal sclera/conjunctiva [PERRL] : pupils equal round and reactive to light [EOMI] : extraocular movements intact [Normal Outer Ear/Nose] : the outer ears and nose were normal in appearance [Normal Oropharynx] : the oropharynx was normal [No JVD] : no jugular venous distention [No Lymphadenopathy] : no lymphadenopathy [Supple] : supple [Thyroid Normal, No Nodules] : the thyroid was normal and there were no nodules present [No Respiratory Distress] : no respiratory distress  [No Accessory Muscle Use] : no accessory muscle use [Clear to Auscultation] : lungs were clear to auscultation bilaterally [Normal Rate] : normal rate  [Regular Rhythm] : with a regular rhythm [Normal S1, S2] : normal S1 and S2 [No Murmur] : no murmur heard [No Carotid Bruits] : no carotid bruits [No Abdominal Bruit] : a ~M bruit was not heard ~T in the abdomen [No Varicosities] : no varicosities [Pedal Pulses Present] : the pedal pulses are present [No Edema] : there was no peripheral edema [No Palpable Aorta] : no palpable aorta [No Extremity Clubbing/Cyanosis] : no extremity clubbing/cyanosis [Soft] : abdomen soft [Non Tender] : non-tender [Non-distended] : non-distended [No Masses] : no abdominal mass palpated [No HSM] : no HSM [Normal Bowel Sounds] : normal bowel sounds [Normal Posterior Cervical Nodes] : no posterior cervical lymphadenopathy [Normal Anterior Cervical Nodes] : no anterior cervical lymphadenopathy [No CVA Tenderness] : no CVA  tenderness [No Spinal Tenderness] : no spinal tenderness [No Joint Swelling] : no joint swelling [Grossly Normal Strength/Tone] : grossly normal strength/tone [No Rash] : no rash [Coordination Grossly Intact] : coordination grossly intact [No Focal Deficits] : no focal deficits [Normal Gait] : normal gait [Deep Tendon Reflexes (DTR)] : deep tendon reflexes were 2+ and symmetric [Normal Affect] : the affect was normal [Normal Insight/Judgement] : insight and judgment were intact [de-identified] : umbilical hernia

## 2024-04-12 NOTE — HISTORY OF PRESENT ILLNESS
[FreeTextEntry8] : Pt reports that he has had an umbiolical hernia for a while. Recently it has become uncomfortable.

## 2024-04-14 ENCOUNTER — NON-APPOINTMENT (OUTPATIENT)
Age: 39
End: 2024-04-14

## 2024-04-15 ENCOUNTER — APPOINTMENT (OUTPATIENT)
Dept: COLORECTAL SURGERY | Facility: CLINIC | Age: 39
End: 2024-04-15

## 2024-04-15 ENCOUNTER — APPOINTMENT (OUTPATIENT)
Dept: SURGERY | Facility: CLINIC | Age: 39
End: 2024-04-15
Payer: COMMERCIAL

## 2024-04-15 VITALS
HEIGHT: 68 IN | WEIGHT: 165 LBS | DIASTOLIC BLOOD PRESSURE: 82 MMHG | OXYGEN SATURATION: 96 % | HEART RATE: 114 BPM | BODY MASS INDEX: 25.01 KG/M2 | SYSTOLIC BLOOD PRESSURE: 173 MMHG

## 2024-04-15 DIAGNOSIS — K42.0 UMBILICAL HERNIA WITH OBSTRUCTION, W/OUT GANGRENE: ICD-10-CM

## 2024-04-15 PROCEDURE — 99203 OFFICE O/P NEW LOW 30 MIN: CPT | Mod: 57

## 2024-04-15 NOTE — HISTORY OF PRESENT ILLNESS
[de-identified] : Incarcerated umbilical hernia [de-identified] : 38 year old white male who has asthma and presents c/o an umbilical hernia. Pt states he noticed umbilical swelling one year ago. Four days ago he began having pain in the area. He denies any nausea or vomiting or changes in his bowel habits. He denies urinary symptoms.

## 2024-04-15 NOTE — REVIEW OF SYSTEMS
[Shortness Of Breath] : no shortness of breath [Wheezing] : wheezing [Cough] : cough [SOB on Exertion] : no shortness of breath during exertion [Orthopnea] : no orthopnea [PND] : no PND [Abdominal Pain] : abdominal pain [Vomiting] : no vomiting [Constipation] : no constipation [Diarrhea] : no diarrhea [Heartburn] : heartburn [Melena] : no melena [Negative] : Heme/Lymph [FreeTextEntry6] : has asthma functional debility s/p neuro event

## 2024-04-15 NOTE — PHYSICAL EXAM
[JVD] : no jugular venous distention  [Normal Thyroid] : the thyroid was normal [Carotid Bruits] : no carotid bruits [Normal Breath Sounds] : Normal breath sounds [Normal Heart Sounds] : normal heart sounds [Normal Rate and Rhythm] : normal rate and rhythm [No Rash or Lesion] : No rash or lesion [Alert] : alert [Oriented to Person] : oriented to person [Oriented to Place] : oriented to place [Oriented to Time] : oriented to time [Calm] : calm [de-identified] : well developed white male in no distress [de-identified] : non injected and non icteric [de-identified] : without adenopathy [de-identified] : normal bowel sounds, without distension or tenderness. With an incarcerated umbilical hernia. [de-identified] : without calf pain or swelling [de-identified] : normal testicles, without palpable hernia

## 2024-04-15 NOTE — ASSESSMENT
[FreeTextEntry1] : I have discussed the signs and symptoms of strangulation and the importance of calling immediately should they develop. I have discussed the risks, benefits and options. Pt would like to schedule an open repair of his incarcerated umbilical hernia.

## 2024-05-03 ENCOUNTER — OUTPATIENT (OUTPATIENT)
Dept: OUTPATIENT SERVICES | Facility: HOSPITAL | Age: 39
LOS: 1 days | End: 2024-05-03
Payer: COMMERCIAL

## 2024-05-03 VITALS
TEMPERATURE: 98 F | DIASTOLIC BLOOD PRESSURE: 88 MMHG | RESPIRATION RATE: 16 BRPM | SYSTOLIC BLOOD PRESSURE: 128 MMHG | WEIGHT: 166.01 LBS | OXYGEN SATURATION: 100 % | HEART RATE: 67 BPM

## 2024-05-03 DIAGNOSIS — K42.0 UMBILICAL HERNIA WITH OBSTRUCTION, WITHOUT GANGRENE: ICD-10-CM

## 2024-05-03 DIAGNOSIS — Z01.818 ENCOUNTER FOR OTHER PREPROCEDURAL EXAMINATION: ICD-10-CM

## 2024-05-03 LAB
ANION GAP SERPL CALC-SCNC: 5 MMOL/L — SIGNIFICANT CHANGE UP (ref 5–17)
BUN SERPL-MCNC: 16 MG/DL — SIGNIFICANT CHANGE UP (ref 7–23)
CALCIUM SERPL-MCNC: 9.4 MG/DL — SIGNIFICANT CHANGE UP (ref 8.5–10.1)
CHLORIDE SERPL-SCNC: 106 MMOL/L — SIGNIFICANT CHANGE UP (ref 96–108)
CO2 SERPL-SCNC: 28 MMOL/L — SIGNIFICANT CHANGE UP (ref 22–31)
CREAT SERPL-MCNC: 1.1 MG/DL — SIGNIFICANT CHANGE UP (ref 0.5–1.3)
EGFR: 87 ML/MIN/1.73M2 — SIGNIFICANT CHANGE UP
GLUCOSE SERPL-MCNC: 103 MG/DL — HIGH (ref 70–99)
HCT VFR BLD CALC: 46.5 % — SIGNIFICANT CHANGE UP (ref 39–50)
HGB BLD-MCNC: 15.6 G/DL — SIGNIFICANT CHANGE UP (ref 13–17)
MCHC RBC-ENTMCNC: 30.8 PG — SIGNIFICANT CHANGE UP (ref 27–34)
MCHC RBC-ENTMCNC: 33.5 GM/DL — SIGNIFICANT CHANGE UP (ref 32–36)
MCV RBC AUTO: 91.7 FL — SIGNIFICANT CHANGE UP (ref 80–100)
NRBC # BLD: 0 /100 WBCS — SIGNIFICANT CHANGE UP (ref 0–0)
PLATELET # BLD AUTO: 432 K/UL — HIGH (ref 150–400)
POTASSIUM SERPL-MCNC: 4.3 MMOL/L — SIGNIFICANT CHANGE UP (ref 3.5–5.3)
POTASSIUM SERPL-SCNC: 4.3 MMOL/L — SIGNIFICANT CHANGE UP (ref 3.5–5.3)
RBC # BLD: 5.07 M/UL — SIGNIFICANT CHANGE UP (ref 4.2–5.8)
RBC # FLD: 12.1 % — SIGNIFICANT CHANGE UP (ref 10.3–14.5)
SODIUM SERPL-SCNC: 139 MMOL/L — SIGNIFICANT CHANGE UP (ref 135–145)
WBC # BLD: 5.94 K/UL — SIGNIFICANT CHANGE UP (ref 3.8–10.5)
WBC # FLD AUTO: 5.94 K/UL — SIGNIFICANT CHANGE UP (ref 3.8–10.5)

## 2024-05-03 PROCEDURE — G0463: CPT

## 2024-05-03 PROCEDURE — 80048 BASIC METABOLIC PNL TOTAL CA: CPT

## 2024-05-03 PROCEDURE — 36415 COLL VENOUS BLD VENIPUNCTURE: CPT

## 2024-05-03 PROCEDURE — 85027 COMPLETE CBC AUTOMATED: CPT

## 2024-05-03 NOTE — H&P PST ADULT - NSICDXFAMILYHX_GEN_ALL_CORE_FT
FAMILY HISTORY:  Mother  Still living? Yes, Estimated age: Age Unknown  FH: Crohn's disease, Age at diagnosis: Age Unknown

## 2024-05-03 NOTE — H&P PST ADULT - ASSESSMENT
39 y/o male with umbilical hernia without obstruction without gangrene.    PLAN:  Repair umbilical hernia on 5/13/24.   Medical clearance needed as per surgeon. CBC and Basic panel ordered.   Pre-op instructions and surgical scrubs given and pt verbalized understanding.

## 2024-05-03 NOTE — H&P PST ADULT - HISTORY OF PRESENT ILLNESS
39 y/o male with PMH of mild asthma and insomnia here for PST. Pt reports to noticing bulge to umbilicus in 2022 but pt started to have umbilical pain about 3 weeks ago. Pt denies n/v/d and current abdominal pain. Pt electing for repair umbilical hernia on 5/13/24.  39 y/o male with PMH of mild asthma and insomnia here for PST. Pt reports to noticing bulge to umbilicus in 2022 but pt started to have umbilical pain about 3 weeks ago. Pt diagnosed with umbilical hernia by PCP. Pt denies n/v/d and current abdominal pain. Pt electing for repair umbilical hernia on 5/13/24.

## 2024-05-04 ENCOUNTER — APPOINTMENT (OUTPATIENT)
Dept: INTERNAL MEDICINE | Facility: CLINIC | Age: 39
End: 2024-05-04
Payer: COMMERCIAL

## 2024-05-04 VITALS
TEMPERATURE: 98.8 F | OXYGEN SATURATION: 98 % | BODY MASS INDEX: 25.01 KG/M2 | HEART RATE: 78 BPM | RESPIRATION RATE: 14 BRPM | SYSTOLIC BLOOD PRESSURE: 112 MMHG | HEIGHT: 68 IN | WEIGHT: 165 LBS | DIASTOLIC BLOOD PRESSURE: 76 MMHG

## 2024-05-04 DIAGNOSIS — Z01.818 ENCOUNTER FOR OTHER PREPROCEDURAL EXAMINATION: ICD-10-CM

## 2024-05-04 PROCEDURE — 99214 OFFICE O/P EST MOD 30 MIN: CPT

## 2024-05-04 NOTE — HISTORY OF PRESENT ILLNESS
[No Pertinent Pulmonary History] : no history of asthma, COPD, sleep apnea, or smoking [No Adverse Anesthesia Reaction] : no adverse anesthesia reaction in self or family member [(Patient denies any chest pain, claudication, dyspnea on exertion, orthopnea, palpitations or syncope)] : Patient denies any chest pain, claudication, dyspnea on exertion, orthopnea, palpitations or syncope [No Pertinent Cardiac History] : no history of aortic stenosis, atrial fibrillation, coronary artery disease, recent myocardial infarction, or implantable device/pacemaker [FreeTextEntry1] : Umbilical hernia repair [FreeTextEntry2] : May 13, 2024 [FreeTextEntry3] : Dr. Sanches

## 2024-05-04 NOTE — PHYSICAL EXAM
[No Acute Distress] : no acute distress [Well Nourished] : well nourished [Well Developed] : well developed [Well-Appearing] : well-appearing [Normal Sclera/Conjunctiva] : normal sclera/conjunctiva [EOMI] : extraocular movements intact [PERRL] : pupils equal round and reactive to light [Normal Outer Ear/Nose] : the outer ears and nose were normal in appearance [Normal Oropharynx] : the oropharynx was normal [No JVD] : no jugular venous distention [No Lymphadenopathy] : no lymphadenopathy [Supple] : supple [Thyroid Normal, No Nodules] : the thyroid was normal and there were no nodules present [No Respiratory Distress] : no respiratory distress  [No Accessory Muscle Use] : no accessory muscle use [Clear to Auscultation] : lungs were clear to auscultation bilaterally [Normal Rate] : normal rate  [Regular Rhythm] : with a regular rhythm [Normal S1, S2] : normal S1 and S2 [No Murmur] : no murmur heard [No Carotid Bruits] : no carotid bruits [No Abdominal Bruit] : a ~M bruit was not heard ~T in the abdomen [No Varicosities] : no varicosities [Pedal Pulses Present] : the pedal pulses are present [No Edema] : there was no peripheral edema [No Extremity Clubbing/Cyanosis] : no extremity clubbing/cyanosis [No Palpable Aorta] : no palpable aorta [Soft] : abdomen soft [No Masses] : no abdominal mass palpated [No HSM] : no HSM [Normal Bowel Sounds] : normal bowel sounds [Normal Posterior Cervical Nodes] : no posterior cervical lymphadenopathy [Normal Anterior Cervical Nodes] : no anterior cervical lymphadenopathy [No CVA Tenderness] : no CVA  tenderness [No Spinal Tenderness] : no spinal tenderness [No Joint Swelling] : no joint swelling [Grossly Normal Strength/Tone] : grossly normal strength/tone [No Rash] : no rash [Coordination Grossly Intact] : coordination grossly intact [No Focal Deficits] : no focal deficits [Normal Gait] : normal gait [Deep Tendon Reflexes (DTR)] : deep tendon reflexes were 2+ and symmetric [Normal Affect] : the affect was normal [Normal Insight/Judgement] : insight and judgment were intact [de-identified] : tender umbilical hernia

## 2024-05-04 NOTE — ASSESSMENT
[High Risk Surgery - Intraperitoneal, Intrathoracic or Supringuinal Vascular Procedures] : High Risk Surgery - Intraperitoneal, Intrathoracic or Supringuinal Vascular Procedures - No (0) [Ischemic Heart Disease] : Ischemic Heart Disease - No (0) [Congestive Heart Failure] : Congestive Heart Failure - No (0) [Prior Cerebrovascular Accident or TIA] : Prior Cerebrovascular Accident or TIA - No (0) [Creatinine >= 2mg/dL (1 Point)] : Creatinine >= 2mg/dL - No (0) [Insulin-dependent Diabetic (1 Point)] : Insulin-dependent Diabetic - No (0) [Patient Optimized for Surgery] : Patient optimized for surgery [FreeTextEntry4] : Medically cleared for proposed procedure.

## 2024-05-10 RX ORDER — SODIUM CHLORIDE 9 MG/ML
1000 INJECTION, SOLUTION INTRAVENOUS
Refills: 0 | Status: DISCONTINUED | OUTPATIENT
Start: 2024-05-13 | End: 2024-05-27

## 2024-05-10 NOTE — ASU PATIENT PROFILE, ADULT - PAIN SCALE PREFERRED, PROFILE
Per mother, patient was at [employer] c/o being hungry. Mother gave her some of her Cashew protein bar. Shortly after child started to complain she was itchy and having a hard time swallowing. Mother took patient home and tried to give her some Benadryl, child said she couldn't swallow it, mother put her in the car and brought her directly here. none

## 2024-05-12 ENCOUNTER — TRANSCRIPTION ENCOUNTER (OUTPATIENT)
Age: 39
End: 2024-05-12

## 2024-05-13 ENCOUNTER — RESULT REVIEW (OUTPATIENT)
Age: 39
End: 2024-05-13

## 2024-05-13 ENCOUNTER — OUTPATIENT (OUTPATIENT)
Dept: OUTPATIENT SERVICES | Facility: HOSPITAL | Age: 39
LOS: 1 days | End: 2024-05-13
Payer: COMMERCIAL

## 2024-05-13 ENCOUNTER — APPOINTMENT (OUTPATIENT)
Dept: SURGERY | Facility: HOSPITAL | Age: 39
End: 2024-05-13

## 2024-05-13 ENCOUNTER — TRANSCRIPTION ENCOUNTER (OUTPATIENT)
Age: 39
End: 2024-05-13

## 2024-05-13 VITALS
OXYGEN SATURATION: 99 % | SYSTOLIC BLOOD PRESSURE: 109 MMHG | DIASTOLIC BLOOD PRESSURE: 69 MMHG | RESPIRATION RATE: 13 BRPM

## 2024-05-13 DIAGNOSIS — K42.0 UMBILICAL HERNIA WITH OBSTRUCTION, WITHOUT GANGRENE: ICD-10-CM

## 2024-05-13 PROCEDURE — 49592 RPR AA HRN 1ST < 3 NCR/STRN: CPT

## 2024-05-13 PROCEDURE — 88305 TISSUE EXAM BY PATHOLOGIST: CPT

## 2024-05-13 PROCEDURE — 88305 TISSUE EXAM BY PATHOLOGIST: CPT | Mod: 26

## 2024-05-13 DEVICE — MESH VERSATEX MONO 15X10CM X3: Type: IMPLANTABLE DEVICE | Status: FUNCTIONAL

## 2024-05-13 DEVICE — MESH HERNIA PARIETEX RECTANGLE 15 X 10CM: Type: IMPLANTABLE DEVICE | Status: FUNCTIONAL

## 2024-05-13 DEVICE — ETHICON HERNIA SECURESTRAP 5MM SIZE 25: Type: IMPLANTABLE DEVICE | Status: FUNCTIONAL

## 2024-05-13 DEVICE — COVIDIEN PROTACK FIXATION DEVICE: Type: IMPLANTABLE DEVICE | Status: FUNCTIONAL

## 2024-05-13 RX ORDER — ALPRAZOLAM 0.25 MG
1 TABLET ORAL
Refills: 0 | DISCHARGE

## 2024-05-13 RX ORDER — CEFAZOLIN SODIUM 1 G
2000 VIAL (EA) INJECTION ONCE
Refills: 0 | Status: COMPLETED | OUTPATIENT
Start: 2024-05-13 | End: 2024-05-13

## 2024-05-13 RX ORDER — MAGNESIUM OXIDE 400 MG ORAL TABLET 241.3 MG
1 TABLET ORAL
Refills: 0 | DISCHARGE

## 2024-05-13 RX ORDER — FLUTICASONE FUROATE, UMECLIDINIUM BROMIDE AND VILANTEROL TRIFENATATE 200; 62.5; 25 UG/1; UG/1; UG/1
0 POWDER RESPIRATORY (INHALATION)
Qty: 0 | Refills: 3 | DISCHARGE

## 2024-05-13 RX ORDER — IBUPROFEN 200 MG
1 TABLET ORAL
Qty: 30 | Refills: 0
Start: 2024-05-13

## 2024-05-13 RX ORDER — OXYCODONE AND ACETAMINOPHEN 5; 325 MG/1; MG/1
1 TABLET ORAL
Qty: 12 | Refills: 0
Start: 2024-05-13

## 2024-05-13 RX ORDER — OXYCODONE HYDROCHLORIDE 5 MG/1
5 TABLET ORAL ONCE
Refills: 0 | Status: DISCONTINUED | OUTPATIENT
Start: 2024-05-13 | End: 2024-05-13

## 2024-05-13 RX ORDER — LANOLIN ALCOHOL/MO/W.PET/CERES
1 CREAM (GRAM) TOPICAL
Refills: 0 | DISCHARGE

## 2024-05-13 RX ORDER — DOCUSATE SODIUM 100 MG
1 CAPSULE ORAL
Qty: 30 | Refills: 0
Start: 2024-05-13 | End: 2024-05-22

## 2024-05-13 RX ORDER — SODIUM CHLORIDE 9 MG/ML
1000 INJECTION, SOLUTION INTRAVENOUS
Refills: 0 | Status: DISCONTINUED | OUTPATIENT
Start: 2024-05-13 | End: 2024-05-13

## 2024-05-13 RX ORDER — ONDANSETRON 8 MG/1
4 TABLET, FILM COATED ORAL ONCE
Refills: 0 | Status: DISCONTINUED | OUTPATIENT
Start: 2024-05-13 | End: 2024-05-13

## 2024-05-13 RX ORDER — NALOXONE HYDROCHLORIDE 4 MG/.1ML
1 SPRAY NASAL
Qty: 1 | Refills: 0
Start: 2024-05-13

## 2024-05-13 RX ORDER — HYDROMORPHONE HYDROCHLORIDE 2 MG/ML
0.5 INJECTION INTRAMUSCULAR; INTRAVENOUS; SUBCUTANEOUS
Refills: 0 | Status: DISCONTINUED | OUTPATIENT
Start: 2024-05-13 | End: 2024-05-13

## 2024-05-13 NOTE — BRIEF OPERATIVE NOTE - NSICDXBRIEFPROCEDURE_GEN_ALL_CORE_FT
PROCEDURES:  Ventral herniorrhaphy using component separation technique 13-May-2024 11:14:47  Jordan Sanches

## 2024-05-13 NOTE — ASU DISCHARGE PLAN (ADULT/PEDIATRIC) - CARE PROVIDER_API CALL
Jordan Sanches  Surgery  [address] [place] [address], [address]  [place] [address]  Phone: [phone]  Fax: [phone]  Established Patient  Follow Up Time: 1-3 days

## 2024-05-13 NOTE — ASU DISCHARGE PLAN (ADULT/PEDIATRIC) - NURSING INSTRUCTIONS
Please call your doctor immediately for any problems or concerns and to schedule followup appt to be seen in 1-3 days. You were given Tylenol today in the operating room next dose may be taken if needed after 5PM today.

## 2024-05-14 LAB — SURGICAL PATHOLOGY STUDY: SIGNIFICANT CHANGE UP

## 2024-05-16 ENCOUNTER — APPOINTMENT (OUTPATIENT)
Dept: SURGERY | Facility: CLINIC | Age: 39
End: 2024-05-16
Payer: COMMERCIAL

## 2024-05-16 PROCEDURE — 99212 OFFICE O/P EST SF 10 MIN: CPT

## 2024-05-16 NOTE — PHYSICAL EXAM
[Normal Breath Sounds] : Normal breath sounds [Calm] : calm [de-identified] : well-developed white male in NAD [de-identified] : Normal BS, non-distended, soft, incision clean and closed [de-identified] : calves soft, non-tender, no le swelling

## 2024-05-16 NOTE — HISTORY OF PRESENT ILLNESS
[de-identified] : s/p Open Repair Umbilical Hernia 05/13/2024 [de-identified] : Patient with mild soreness, but denies fever, chills or wound drainage.  Patient is tolerating a regular diet, with normal bowel habits.  Final Diagnosis Mature adipose tissue and dense fibroconnective tissue, clinically incarcerated omentum.

## 2024-06-13 ENCOUNTER — APPOINTMENT (OUTPATIENT)
Dept: SURGERY | Facility: CLINIC | Age: 39
End: 2024-06-13
Payer: COMMERCIAL

## 2024-06-13 DIAGNOSIS — Z09 ENCOUNTER FOR FOLLOW-UP EXAMINATION AFTER COMPLETED TREATMENT FOR CONDITIONS OTHER THAN MALIGNANT NEOPLASM: ICD-10-CM

## 2024-06-13 PROCEDURE — 99212 OFFICE O/P EST SF 10 MIN: CPT

## 2024-06-13 NOTE — PHYSICAL EXAM
[Normal Breath Sounds] : Normal breath sounds [Alert] : alert [Oriented to Person] : oriented to person [Oriented to Place] : oriented to place [Oriented to Time] : oriented to time [Calm] : calm [de-identified] : well-developed male in NAD [de-identified] : Normal BS, soft, non-tender, incision clean and closed [de-identified] : calves soft, non-tender

## 2024-06-13 NOTE — PLAN
[FreeTextEntry1] : -Instruct patient to avoid heavy lifting or abdominal/core exercises for 6 months post-op. -Patient may lift 20 lbs this month, increasing by 5 lbs/month thereafter. -Recommend avoiding sun exposure to incision. -Advil/Motrin with food, if tolerates and/or warm compresses to site prn mild discomfort. -Follow up prn.

## 2024-06-13 NOTE — HISTORY OF PRESENT ILLNESS
[de-identified] : s/p Open Repair Umbilical Hernia 05/13/2024 [de-identified] : Patient returns feeling well.   Patient denies pain, fever, chills or incision drainage.  Patient is tolerating a regular diet, with normal bowel habits.  Final Diagnosis Mature adipose tissue and dense fibroconnective tissue, clinically incarcerated omentum.

## 2024-06-21 ENCOUNTER — APPOINTMENT (OUTPATIENT)
Dept: DERMATOLOGY | Facility: CLINIC | Age: 39
End: 2024-06-21

## 2024-07-24 ENCOUNTER — APPOINTMENT (OUTPATIENT)
Dept: PULMONOLOGY | Facility: CLINIC | Age: 39
End: 2024-07-24

## 2024-08-20 ENCOUNTER — RX RENEWAL (OUTPATIENT)
Age: 39
End: 2024-08-20

## 2024-11-03 ENCOUNTER — NON-APPOINTMENT (OUTPATIENT)
Age: 39
End: 2024-11-03

## 2024-11-16 ENCOUNTER — NON-APPOINTMENT (OUTPATIENT)
Age: 39
End: 2024-11-16

## 2024-11-27 ENCOUNTER — NON-APPOINTMENT (OUTPATIENT)
Age: 39
End: 2024-11-27

## 2024-12-18 ENCOUNTER — RX RENEWAL (OUTPATIENT)
Age: 39
End: 2024-12-18

## 2025-01-24 ENCOUNTER — APPOINTMENT (OUTPATIENT)
Dept: PULMONOLOGY | Facility: CLINIC | Age: 40
End: 2025-01-24

## 2025-04-25 ENCOUNTER — APPOINTMENT (OUTPATIENT)
Dept: PULMONOLOGY | Facility: CLINIC | Age: 40
End: 2025-04-25

## 2025-05-09 ENCOUNTER — APPOINTMENT (OUTPATIENT)
Dept: PULMONOLOGY | Facility: CLINIC | Age: 40
End: 2025-05-09
Payer: COMMERCIAL

## 2025-05-09 VITALS
WEIGHT: 155 LBS | TEMPERATURE: 97 F | HEIGHT: 68 IN | HEART RATE: 105 BPM | OXYGEN SATURATION: 99 % | SYSTOLIC BLOOD PRESSURE: 116 MMHG | RESPIRATION RATE: 16 BRPM | BODY MASS INDEX: 23.49 KG/M2 | DIASTOLIC BLOOD PRESSURE: 70 MMHG

## 2025-05-09 DIAGNOSIS — Z87.891 PERSONAL HISTORY OF NICOTINE DEPENDENCE: ICD-10-CM

## 2025-05-09 DIAGNOSIS — K21.9 GASTRO-ESOPHAGEAL REFLUX DISEASE W/OUT ESOPHAGITIS: ICD-10-CM

## 2025-05-09 DIAGNOSIS — J45.909 UNSPECIFIED ASTHMA, UNCOMPLICATED: ICD-10-CM

## 2025-05-09 DIAGNOSIS — R09.82 POSTNASAL DRIP: ICD-10-CM

## 2025-05-09 DIAGNOSIS — R79.89 OTHER SPECIFIED ABNORMAL FINDINGS OF BLOOD CHEMISTRY: ICD-10-CM

## 2025-05-09 PROCEDURE — 94729 DIFFUSING CAPACITY: CPT

## 2025-05-09 PROCEDURE — 95012 NITRIC OXIDE EXP GAS DETER: CPT

## 2025-05-09 PROCEDURE — 94727 GAS DIL/WSHOT DETER LNG VOL: CPT

## 2025-05-09 PROCEDURE — 99214 OFFICE O/P EST MOD 30 MIN: CPT | Mod: 25

## 2025-05-09 PROCEDURE — ZZZZZ: CPT

## 2025-05-09 PROCEDURE — 94010 BREATHING CAPACITY TEST: CPT

## 2025-09-13 ENCOUNTER — RX RENEWAL (OUTPATIENT)
Age: 40
End: 2025-09-13

## (undated) DEVICE — GOWN XL W TOWEL

## (undated) DEVICE — PLV/PSP-SUCTION IRRIGATOR STRYKER: Type: DURABLE MEDICAL EQUIPMENT

## (undated) DEVICE — DRSG STERISTRIPS 0.5 X 4"

## (undated) DEVICE — Device

## (undated) DEVICE — SUT POLYSORB 0 30" GU-46

## (undated) DEVICE — DRAPE 3/4 SHEET W REINFORCEMENT 56X77"

## (undated) DEVICE — DRSG MASTISOL

## (undated) DEVICE — PLV-SCD MACHINE: Type: DURABLE MEDICAL EQUIPMENT

## (undated) DEVICE — WARMING BLANKET UPPER ADULT

## (undated) DEVICE — SOL IRR POUR NS 0.9% 1000ML

## (undated) DEVICE — TROCAR COVIDIEN VERSAONE FIXATION CANNULA 5MM

## (undated) DEVICE — DRSG TEGADERM 2.5X3"

## (undated) DEVICE — TROCAR COVIDIEN VERSAONE BLUNT TIP HASSAN 12MM

## (undated) DEVICE — NDL HYPO SAFE 25G X 1.5" (ORANGE)

## (undated) DEVICE — SUT VLOC 180 3-0 6" V-20 GREEN

## (undated) DEVICE — DRAPE TOWEL BLUE 17" X 24"

## (undated) DEVICE — SUT MONOCRYL 4-0 27" PS-2 UNDYED

## (undated) DEVICE — LIGASURE MARYLAND 37CM

## (undated) DEVICE — VENODYNE/SCD SLEEVE CALF LARGE

## (undated) DEVICE — ELCTR BOVIE TIP BLADE INSULATED 2.75" EDGE

## (undated) DEVICE — D HELP - CLEARVIEW CLEARIFY SYSTEM

## (undated) DEVICE — VENODYNE/SCD SLEEVE CALF MEDIUM

## (undated) DEVICE — BLADE SCALPEL SAFETYLOCK #15

## (undated) DEVICE — DRSG TELFA 3 X 8

## (undated) DEVICE — TUBING STRYKER PNEUMOCLEAR HIGH FLOW

## (undated) DEVICE — TUBING STRYKEFLOW II SUCTION / IRRIGATOR

## (undated) DEVICE — FOLEY TRAY 14FR 5CC LF UMETER CLOSED

## (undated) DEVICE — DRSG CURITY GAUZE SPONGE 4 X 4" 12-PLY

## (undated) DEVICE — GLV 8 PROTEXIS (WHITE)

## (undated) DEVICE — SOL IRR POUR H2O 1000ML

## (undated) DEVICE — PACK GENERAL LAPAROSCOPY

## (undated) DEVICE — TROCAR COVIDIEN VERSAPORT BLADELESS OPTICAL 5MM STANDARD

## (undated) DEVICE — SOL IRR BAG NS 0.9% 3000ML